# Patient Record
Sex: FEMALE | Race: WHITE | NOT HISPANIC OR LATINO
[De-identification: names, ages, dates, MRNs, and addresses within clinical notes are randomized per-mention and may not be internally consistent; named-entity substitution may affect disease eponyms.]

---

## 2020-04-10 ENCOUNTER — TRANSCRIPTION ENCOUNTER (OUTPATIENT)
Age: 50
End: 2020-04-10

## 2021-08-25 PROBLEM — Z00.00 ENCOUNTER FOR PREVENTIVE HEALTH EXAMINATION: Status: ACTIVE | Noted: 2021-08-25

## 2021-09-07 ENCOUNTER — APPOINTMENT (OUTPATIENT)
Dept: HEPATOLOGY | Facility: CLINIC | Age: 51
End: 2021-09-07
Payer: MEDICAID

## 2021-09-07 VITALS
WEIGHT: 201 LBS | HEART RATE: 112 BPM | SYSTOLIC BLOOD PRESSURE: 157 MMHG | RESPIRATION RATE: 14 BRPM | DIASTOLIC BLOOD PRESSURE: 84 MMHG | TEMPERATURE: 97.9 F

## 2021-09-07 DIAGNOSIS — R73.03 PREDIABETES.: ICD-10-CM

## 2021-09-07 DIAGNOSIS — Z72.89 OTHER PROBLEMS RELATED TO LIFESTYLE: ICD-10-CM

## 2021-09-07 PROCEDURE — 99205 OFFICE O/P NEW HI 60 MIN: CPT

## 2021-09-08 LAB
ALBUMIN SERPL ELPH-MCNC: 4.9 G/DL
ALP BLD-CCNC: 154 U/L
ALT SERPL-CCNC: 57 U/L
ANION GAP SERPL CALC-SCNC: 12 MMOL/L
AST SERPL-CCNC: 38 U/L
BASOPHILS # BLD AUTO: 0.05 K/UL
BASOPHILS NFR BLD AUTO: 0.8 %
BILIRUB SERPL-MCNC: 0.3 MG/DL
BUN SERPL-MCNC: 12 MG/DL
CALCIUM SERPL-MCNC: 10.8 MG/DL
CHLORIDE SERPL-SCNC: 103 MMOL/L
CHOLEST SERPL-MCNC: 278 MG/DL
CO2 SERPL-SCNC: 27 MMOL/L
CREAT SERPL-MCNC: 0.82 MG/DL
DEPRECATED KAPPA LC FREE/LAMBDA SER: 0.98 RATIO
EOSINOPHIL # BLD AUTO: 0.04 K/UL
EOSINOPHIL NFR BLD AUTO: 0.7 %
ESTIMATED AVERAGE GLUCOSE: 134 MG/DL
HAV IGM SER QL: NONREACTIVE
HBA1C MFR BLD HPLC: 6.3 %
HBV CORE IGM SER QL: NONREACTIVE
HBV SURFACE AG SER QL: NONREACTIVE
HCT VFR BLD CALC: 43.1 %
HCV AB SER QL: NONREACTIVE
HCV S/CO RATIO: 0.14 S/CO
HDLC SERPL-MCNC: 59 MG/DL
HEPATITIS A IGG ANTIBODY: REACTIVE
HGB BLD-MCNC: 14 G/DL
IGA SER QL IEP: 359 MG/DL
IGG SER QL IEP: 1472 MG/DL
IGM SER QL IEP: 237 MG/DL
IMM GRANULOCYTES NFR BLD AUTO: 0.2 %
INR PPP: 0.96 RATIO
KAPPA LC CSF-MCNC: 1.69 MG/DL
KAPPA LC SERPL-MCNC: 1.65 MG/DL
LDLC SERPL CALC-MCNC: 177 MG/DL
LYMPHOCYTES # BLD AUTO: 2.37 K/UL
LYMPHOCYTES NFR BLD AUTO: 39.2 %
MAN DIFF?: NORMAL
MCHC RBC-ENTMCNC: 29.9 PG
MCHC RBC-ENTMCNC: 32.5 GM/DL
MCV RBC AUTO: 92.1 FL
MONOCYTES # BLD AUTO: 0.46 K/UL
MONOCYTES NFR BLD AUTO: 7.6 %
NEUTROPHILS # BLD AUTO: 3.12 K/UL
NEUTROPHILS NFR BLD AUTO: 51.5 %
NONHDLC SERPL-MCNC: 219 MG/DL
PLATELET # BLD AUTO: 249 K/UL
POTASSIUM SERPL-SCNC: 4.3 MMOL/L
PROT SERPL-MCNC: 8 G/DL
PT BLD: 11.3 SEC
RBC # BLD: 4.68 M/UL
RBC # FLD: 13.2 %
SODIUM SERPL-SCNC: 142 MMOL/L
TRIGL SERPL-MCNC: 210 MG/DL
WBC # FLD AUTO: 6.05 K/UL

## 2021-09-08 NOTE — REVIEW OF SYSTEMS
[Suicidal] : not suicidal [Sleep Disturbances] : no sleep disturbances [Anxiety] : no anxiety [Depression] : depression [Change In Personality] : no personality change [Emotional Problems] : no emotional problems [Negative] : Heme/Lymph [de-identified] : On Meds

## 2021-09-08 NOTE — HISTORY OF PRESENT ILLNESS
[de-identified] : Ms. FEMI LAM is 51 year old female who presents for the initial evaluation with Liver enzymes elevations. She feels well. Denies c/o abdominal pain, pruritis, melena, No MH/O Liver diseases, clinical hepatitis, Jaundice.\par \par Social H/O denies alcohol use or smoking. Reports no high risk occupation/behavior.\par MH/O- Pre-diabetic and SH/O- Cholecystectomy for Cholelithiasis.\par Denies any FH/O Liver disease or GI cancers. \par \par Allergies - NKDA\par COL/EGD – None \par Immunity – Immune to HAV, pending HBV. She completed the 2 dose COVID vaccine with no adverse effects in April 2021. S/p COVID infection in Feb 2021.\par \par Labs on 09/07/21 shows elevated ALT 57, , WDL- AST 38, Tbil 0.3, CBC, INR, Non-reactive acute Hepatitis A, B and C. A1C 6.3%, Hyperlipidemia with Cho/Tgl 210/278.\par Labs in 08/24/2021 shows AST/ALT 41/53,  with WDL Tbil 0.4. ASMA 1:80. \par US ab on 06/11/2021 shows Hepatic steatosis, Hepatomegaly, Left renal cyst.\par

## 2021-09-08 NOTE — PHYSICAL EXAM
[Scleral Icterus] : No Scleral Icterus [Spider Angioma] : No spider angioma(s) were observed [Abdominal  Ascites] : no ascites [Asterixis] : no asterixis observed [Jaundice] : No jaundice [Palmar Erythema] : no Palmar Erythema [Depression] : depression [FreeTextEntry7] : On meds [General Appearance - Alert] : alert [General Appearance - Well Nourished] : well nourished [General Appearance - Well-Appearing] : healthy appearing [Sclera] : the sclera and conjunctiva were normal [Neck Appearance] : the appearance of the neck was normal [Neck Cervical Mass (___cm)] : no neck mass was observed [Apical Impulse] : the apical impulse was normal [Exaggerated Use Of Accessory Muscles For Inspiration] : no accessory muscle use [Heart Sounds] : normal S1 and S2 [Edema] : there was no peripheral edema [Veins - Varicosity Changes] : there were no varicosital changes [Bowel Sounds] : normal bowel sounds [Abdomen Hernia] : no hernia was discovered [Abdomen Mass (___ Cm)] : no abdominal mass palpated [Cervical Lymph Nodes Enlarged Posterior Bilaterally] : posterior cervical [Supraclavicular Lymph Nodes Enlarged Bilaterally] : supraclavicular [Nail Clubbing] : no clubbing  or cyanosis of the fingernails [Involuntary Movements] : no involuntary movements were seen [] : no rash [Skin Lesions] : no skin lesions [No Focal Deficits] : no focal deficits [Sensation] : the sensory exam was normal to light touch and pinprick [Oriented To Time, Place, And Person] : oriented to person, place, and time

## 2021-09-08 NOTE — ASSESSMENT
[FreeTextEntry1] : Ms. FEMI LAM is 51 year old female with Liver enzymes elevations. \par \par # Elevated Liver enzymes - Labs on 09/07/21 shows elevated ALT 57, , WDL- AST 38, Tbil 0.3, CBC, INR, Non-reactive acute Hepatitis A, B and C. ASMA 1:80. Ruled out Acute Hepatitis. \par \par ? DILI – On psychiatrist meds and over 5 OTC vitamins and  supplements. Advised to stop all the Meds which is avoidable for now and recheck labs in a  month. Pending the immune panel.\par \par # Hepatic steatosis - US ab on 06/11/2021 shows Hepatic steatosis, Hepatomegaly, Left renal cyst. Advised to get the FS done to quantify the fibrosis and steatosis.  \par FEMI was educated about the fatty liver disease. Reviewed the spectrum of disease, the risk of disease progression to developing cirrhosis and the associated complications. Explained the patient may develop liver cancer without cirrhosis and therefore should be under the yearly surveillance with an abdominal ultrasound. Taught back that the best treatment of fatty liver disease is diet and exercise. Discussed the present diet with the patient and recommended the avoidance of fatty foods and to follow a heart healthy diet. Also explained that weight loss may lead to an improvement in the overall underlying liver disease. Taught back the physiology of alcohol abstinence has a important contribution to liver health. \par \par # Risk factors- A1C 6.3%, Hyperlipidemia with Cho/Tgl 210/278. MH/O- Pre-diabetic and SH/O- Cholecystectomy for Cholelithiasis.\par \par # COL/EGD – Denies any FH/O Liver disease or GI cancers. \par # Immunity – Immune to HAV, pending HBV. She completed the 2 dose COVID vaccine with no adverse effects in April 2021. S/p COVID infection in Feb 2021.\par \par PLAN to F/U in a month for RPA with FS to discuss the plan of care.\par Encouraged to call back in the interim with any issues or concerns so that we can address and assist as required.

## 2021-09-09 LAB
ANA PAT FLD IF-IMP: ABNORMAL
ANA SER IF-ACNC: ABNORMAL

## 2021-09-10 LAB
MITOCHONDRIA AB SER IF-ACNC: NORMAL
SMOOTH MUSCLE AB SER QL IF: ABNORMAL

## 2021-09-17 ENCOUNTER — APPOINTMENT (OUTPATIENT)
Dept: UROLOGY | Facility: CLINIC | Age: 51
End: 2021-09-17
Payer: MEDICAID

## 2021-09-17 VITALS
WEIGHT: 200 LBS | RESPIRATION RATE: 14 BRPM | HEART RATE: 73 BPM | HEIGHT: 67 IN | TEMPERATURE: 97.8 F | DIASTOLIC BLOOD PRESSURE: 82 MMHG | OXYGEN SATURATION: 99 % | SYSTOLIC BLOOD PRESSURE: 121 MMHG | BODY MASS INDEX: 31.39 KG/M2

## 2021-09-17 DIAGNOSIS — Z86.59 PERSONAL HISTORY OF OTHER MENTAL AND BEHAVIORAL DISORDERS: ICD-10-CM

## 2021-09-17 DIAGNOSIS — Z63.5 DISRUPTION OF FAMILY BY SEPARATION AND DIVORCE: ICD-10-CM

## 2021-09-17 DIAGNOSIS — Z87.891 PERSONAL HISTORY OF NICOTINE DEPENDENCE: ICD-10-CM

## 2021-09-17 PROCEDURE — 99203 OFFICE O/P NEW LOW 30 MIN: CPT | Mod: 25

## 2021-09-17 PROCEDURE — 51736 URINE FLOW MEASUREMENT: CPT

## 2021-09-17 SDOH — SOCIAL STABILITY - SOCIAL INSECURITY: DISRUPTION OF FAMILY BY SEPARATION AND DIVORCE: Z63.5

## 2021-09-17 NOTE — LETTER BODY
[Dear  ___] : Dear  [unfilled], [Consult Letter:] : I had the pleasure of evaluating your patient, [unfilled]. [Consult Closing:] : Thank you very much for allowing me to participate in the care of this patient.  If you have any questions, please do not hesitate to contact me. [FreeTextEntry1] : Address: [address] [place] [id number]\par Tel: [phone]

## 2021-09-17 NOTE — PHYSICAL EXAM
[General Appearance - Well Developed] : well developed [General Appearance - Well Nourished] : well nourished [Normal Appearance] : normal appearance [Well Groomed] : well groomed [General Appearance - In No Acute Distress] : no acute distress [Edema] : no peripheral edema [Respiration, Rhythm And Depth] : normal respiratory rhythm and effort [Exaggerated Use Of Accessory Muscles For Inspiration] : no accessory muscle use [Abdomen Soft] : soft [Abdomen Tenderness] : non-tender [Costovertebral Angle Tenderness] : no ~M costovertebral angle tenderness [FreeTextEntry1] : Bladder not distended [Normal Station and Gait] : the gait and station were normal for the patient's age [] : no rash [No Focal Deficits] : no focal deficits [Oriented To Time, Place, And Person] : oriented to person, place, and time [Affect] : the affect was normal [Mood] : the mood was normal [Not Anxious] : not anxious

## 2021-09-17 NOTE — REVIEW OF SYSTEMS
[Dry Eyes] : dryness of the eyes [Palpitations] : palpitations [Diarrhea] : diarrhea [Heartburn] : heartburn [Presently in menopause ___] : presently in menopause [unfilled] [Leakage of urine with straining, coughing, laughing] : leakage of urine with straining, coughing, laughing [Joint Pain] : joint pain [Itching] : itching [Dizziness] : dizziness [Anxiety] : anxiety [Depression] : depression [Hot Flashes] : hot flashes [Negative] : Heme/Lymph

## 2021-09-17 NOTE — HISTORY OF PRESENT ILLNESS
[FreeTextEntry1] : She is a 51-year-old woman who is seen today for initial visit.  She is here today to discuss results of ultrasound.  Ultrasound was done to evaluate the liver.  Sometimes she has nocturia 3-4 times and at other times is only once.  Every now and then she has to double void to empty her bladder.  Uroflow today showed normal maximum urine flow of 20 mL/s, average 15 mL/s, she voided 420 cc and the residual urine volume was about 100 cc.  Ultrasound from radiology in June 2021 showed a left 0.23 cm renal cyst.

## 2021-09-17 NOTE — ASSESSMENT
[FreeTextEntry1] : Ultrasound report was discussed with her.  Simple renal cysts are sacs filled with fluid. The exact cause of renal cysts is unknown but cysts are more common with advancing age. Up to one third of patients over age 70 have renal cysts. Having many renal cysts is different than polycystic renal disease. Simple renal cysts are usually found incidentally with imaging studies (US, CT scan or MRI), are usually asymptomatic and do not require any treatment. Very large renal cysts can cause dull pain or discomfort. Cysts can rarely become infected, develop bleeding, rupture or impair renal function. Surgical removal or drainage and sclerotherapy of renal cysts can be performed in specific clinical settings. Bosniak classification of renal cysts into 5 categories was reviewed: simple (category I), minimally complex (category II and IIF), indeterminate (category III) and solid (category IV). Malignancy risk, treatment and follow-up of renal cysts based on category were discussed. Patient's questions were answered.  She appears to have a simple renal cyst which generally does not require any further follow-up.  Uroflow results was discussed.  She does not seem to be very bothered by urination.  She can follow-up in 1 year or as needed.\par \par Seb Hussein MD, FACS\par [facility]\par  of Urology\par \par [address], [address]\[place] [address]\par \par [address], [address]\[place] [address]\par \par Tel: [phone]\par Fax: [phone]

## 2021-10-12 ENCOUNTER — APPOINTMENT (OUTPATIENT)
Dept: HEPATOLOGY | Facility: CLINIC | Age: 51
End: 2021-10-12
Payer: MEDICAID

## 2021-10-12 VITALS
HEART RATE: 73 BPM | WEIGHT: 201 LBS | HEIGHT: 67 IN | TEMPERATURE: 97.9 F | RESPIRATION RATE: 14 BRPM | SYSTOLIC BLOOD PRESSURE: 138 MMHG | DIASTOLIC BLOOD PRESSURE: 87 MMHG | BODY MASS INDEX: 31.55 KG/M2

## 2021-10-12 PROCEDURE — 99215 OFFICE O/P EST HI 40 MIN: CPT

## 2021-10-12 PROCEDURE — 91200 LIVER ELASTOGRAPHY: CPT

## 2021-10-12 RX ORDER — CYCLOBENZAPRINE HYDROCHLORIDE 5 MG/1
5 TABLET, FILM COATED ORAL
Qty: 14 | Refills: 0 | Status: DISCONTINUED | COMMUNITY
Start: 2021-06-04 | End: 2021-10-12

## 2021-10-12 NOTE — REVIEW OF SYSTEMS
[Depression] : depression [Negative] : Heme/Lymph [Suicidal] : not suicidal [Sleep Disturbances] : no sleep disturbances [Anxiety] : no anxiety [Change In Personality] : no personality change [Emotional Problems] : no emotional problems [de-identified] : On Meds

## 2021-10-12 NOTE — PHYSICAL EXAM
[Depression] : depression [General Appearance - Alert] : alert [General Appearance - Well Nourished] : well nourished [General Appearance - Well-Appearing] : healthy appearing [Sclera] : the sclera and conjunctiva were normal [Neck Appearance] : the appearance of the neck was normal [Neck Cervical Mass (___cm)] : no neck mass was observed [Exaggerated Use Of Accessory Muscles For Inspiration] : no accessory muscle use [Apical Impulse] : the apical impulse was normal [Heart Sounds] : normal S1 and S2 [Edema] : there was no peripheral edema [Veins - Varicosity Changes] : there were no varicosital changes [Bowel Sounds] : normal bowel sounds [Abdomen Mass (___ Cm)] : no abdominal mass palpated [Abdomen Hernia] : no hernia was discovered [Cervical Lymph Nodes Enlarged Posterior Bilaterally] : posterior cervical [Supraclavicular Lymph Nodes Enlarged Bilaterally] : supraclavicular [Nail Clubbing] : no clubbing  or cyanosis of the fingernails [Involuntary Movements] : no involuntary movements were seen [] : no rash [Skin Lesions] : no skin lesions [Sensation] : the sensory exam was normal to light touch and pinprick [No Focal Deficits] : no focal deficits [Oriented To Time, Place, And Person] : oriented to person, place, and time [Scleral Icterus] : No Scleral Icterus [Spider Angioma] : No spider angioma(s) were observed [Abdominal  Ascites] : no ascites [Asterixis] : no asterixis observed [Jaundice] : No jaundice [Palmar Erythema] : no Palmar Erythema [FreeTextEntry7] : On meds

## 2021-10-12 NOTE — HISTORY OF PRESENT ILLNESS
[de-identified] : Ms. FEMI LAM is 51 year old female who presents for the initial evaluation with Liver enzymes elevations. She feels well. Denies c/o abdominal pain, pruritis, melena, No MH/O Liver diseases, clinical hepatitis, Jaundice.\par \par Social H/O denies alcohol use or smoking. Reports no high risk occupation/behavior.\par MH/O- Pre-diabetic and SH/O- Cholecystectomy for Cholelithiasis.\par Denies any FH/O Liver disease or GI cancers. \par \par Immunity – Immune to HAV, pending HBV. She completed the 2 dose COVID vaccine with no adverse effects in April 2021. S/p COVID infection in Feb 2021.\par \par Labs on 09/07/21 shows elevated ALT 57, , WDL- AST 38, Tbil 0.3, CBC, INR, Non-reactive acute Hepatitis A, B and C. A1C 6.3%, Hyperlipidemia with Cho/Tgl 210/278.\par Labs in 08/24/2021 shows AST/ALT 41/53,  with WDL Tbil 0.4. ASMA 1:80. \par \par US ab on 06/11/2021 shows Hepatic steatosis, Hepatomegaly, Left renal cyst.\par

## 2021-10-12 NOTE — ASSESSMENT
[FreeTextEntry1] : Ms. FEMI LAM is 51 year old female with Liver enzymes elevations. \par \par # Elevated Liver enzymes - Labs on 09/07/21 shows elevated ALT 57, , WDL- AST 38, Tbil 0.3, CBC, INR, Non-reactive acute Hepatitis A, B and C. ASMA 1:80. Ruled out Acute Hepatitis. Will repeat labs today and in a month, advised to call back to discuss the results. \par \par ? PBC – ALP elevated with normal Tbil and SAMMY, ASMA positive and AMA negative. Will order more autoimmune labs as ordered. Advised to call back to discuss the results. FS shows no fibrosis. Do not want to do any Liver Biopsy yet. Will re-address it with next RPA.\par \par # DILI – On psychiatrist meds and over 5 OTC vitamins and supplements. Advised to stop all the Meds which is avoidable for now and recheck labs in a month. Discussed the LiverTox likelihood scoring with Amitriptyline- B, Metoprolol –D, Cyclobenzaprine and Sumatriptan – E. \par \par # Hepatic steatosis - US ab on 06/11/2021 shows Hepatic steatosis, Hepatomegaly, Left renal cyst. FS done shows S3 steatosis. \par FEMI was educated about the fatty liver disease. Reviewed the spectrum of disease, the risk of disease progression to developing cirrhosis and the associated complications. Explained the patient may develop liver cancer without cirrhosis and therefore should be under the yearly surveillance with an abdominal ultrasound. Taught back that the best treatment of fatty liver disease is diet and exercise. Discussed the present diet with the patient and recommended the avoidance of fatty foods and to follow a heart healthy diet. Also explained that weight loss may lead to an improvement in the overall underlying liver disease. Taught back the physiology of alcohol abstinence has an important contribution to liver health. \par \par # Metabolic syndrome- A1C 6.3%, Hyperlipidemia with Cho/Tgl 210/278, BMI>30, HTN. MH/O- Pre-diabetic and SH/O- Cholecystectomy for Cholelithiasis.\par Reviewed the spectrum of disease, the risk of disease progression with added risk factors commonly seen in patients with diabetes or those who are overweight or vice versa, a precursor to the development of diabetes as it is a component of the metabolic syndrome. Advised to F/U with PCP/Cardiology for treatment regimen. \par \par # COL/EGD – Denies any FH/O Liver disease or GI cancers. Wanted to discuss with later RPA’s.\par # Immunity – Immune to HAV, pending HBV Ab. She completed the 2 dose COVID vaccine with no adverse effects in April 2021. S/p COVID infection in Feb 2021.\par \par # Renal cyst – Simple cyst incidentally found in the US ab, No further intervention required. F/U Orology Dr. Seb Hussein.\par \par PLAN to F/U in 3 month for RPA and US ab.\par Encouraged to call back in the interim with any issues or concerns so that we can address and assist as required. \par

## 2021-10-13 LAB
25(OH)D3 SERPL-MCNC: 29.6 NG/ML
ALBUMIN SERPL ELPH-MCNC: 4.7 G/DL
ALP BLD-CCNC: 136 U/L
ALT SERPL-CCNC: 55 U/L
ANION GAP SERPL CALC-SCNC: 11 MMOL/L
AST SERPL-CCNC: 37 U/L
BILIRUB SERPL-MCNC: 0.5 MG/DL
BUN SERPL-MCNC: 11 MG/DL
CALCIUM SERPL-MCNC: 10.4 MG/DL
CHLORIDE SERPL-SCNC: 104 MMOL/L
CO2 SERPL-SCNC: 27 MMOL/L
CREAT SERPL-MCNC: 0.8 MG/DL
POTASSIUM SERPL-SCNC: 4.9 MMOL/L
PROT SERPL-MCNC: 8 G/DL
SODIUM SERPL-SCNC: 141 MMOL/L

## 2021-10-14 LAB
ANA PAT FLD IF-IMP: ABNORMAL
ANA SER IF-ACNC: ABNORMAL

## 2021-10-15 LAB — SMOOTH MUSCLE AB SER QL IF: ABNORMAL

## 2021-10-18 LAB
ALP BLD-CCNC: 132 IU/L
ALP BONE CFR SERPL: 45 %
ALP INTEST CFR SERPL: 12 %
ALP LIVER CFR SERPL: 43 %
ANCA AB SER-IMP: ABNORMAL
C-ANCA SER-ACNC: NEGATIVE
LKM AB SER QL IF: <20.1 UNITS
P-ANCA TITR SER IF: NEGATIVE
VIT A SERPL-MCNC: 70 UG/DL

## 2021-10-20 LAB — SOLUBLE LIVER IGG SER IA-ACNC: 1.3

## 2021-11-02 ENCOUNTER — APPOINTMENT (OUTPATIENT)
Dept: RADIOLOGY | Facility: CLINIC | Age: 51
End: 2021-11-02
Payer: MEDICAID

## 2021-11-02 ENCOUNTER — OUTPATIENT (OUTPATIENT)
Dept: OUTPATIENT SERVICES | Facility: HOSPITAL | Age: 51
LOS: 1 days | End: 2021-11-02
Payer: MEDICAID

## 2021-11-02 DIAGNOSIS — R74.8 ABNORMAL LEVELS OF OTHER SERUM ENZYMES: ICD-10-CM

## 2021-11-02 PROCEDURE — 77080 DXA BONE DENSITY AXIAL: CPT | Mod: 26

## 2021-11-02 PROCEDURE — 77080 DXA BONE DENSITY AXIAL: CPT

## 2021-11-19 LAB
ALBUMIN SERPL ELPH-MCNC: 4.8 G/DL
ALP BLD-CCNC: 152 U/L
ALT SERPL-CCNC: 43 U/L
ANION GAP SERPL CALC-SCNC: 12 MMOL/L
AST SERPL-CCNC: 33 U/L
BASOPHILS # BLD AUTO: 0.03 K/UL
BASOPHILS NFR BLD AUTO: 0.6 %
BILIRUB SERPL-MCNC: 0.3 MG/DL
BUN SERPL-MCNC: 10 MG/DL
CALCIUM SERPL-MCNC: 10.3 MG/DL
CHLORIDE SERPL-SCNC: 104 MMOL/L
CO2 SERPL-SCNC: 25 MMOL/L
CREAT SERPL-MCNC: 0.77 MG/DL
EOSINOPHIL # BLD AUTO: 0.05 K/UL
EOSINOPHIL NFR BLD AUTO: 1 %
GGT SERPL-CCNC: 33 U/L
HCT VFR BLD CALC: 42.8 %
HGB BLD-MCNC: 13.7 G/DL
IMM GRANULOCYTES NFR BLD AUTO: 0.2 %
LYMPHOCYTES # BLD AUTO: 2.52 K/UL
LYMPHOCYTES NFR BLD AUTO: 52.1 %
MAN DIFF?: NORMAL
MCHC RBC-ENTMCNC: 29.8 PG
MCHC RBC-ENTMCNC: 32 GM/DL
MCV RBC AUTO: 93 FL
MONOCYTES # BLD AUTO: 0.44 K/UL
MONOCYTES NFR BLD AUTO: 9.1 %
NEUTROPHILS # BLD AUTO: 1.79 K/UL
NEUTROPHILS NFR BLD AUTO: 37 %
PLATELET # BLD AUTO: 244 K/UL
POTASSIUM SERPL-SCNC: 4.4 MMOL/L
PROT SERPL-MCNC: 7.7 G/DL
RBC # BLD: 4.6 M/UL
RBC # FLD: 13 %
SODIUM SERPL-SCNC: 141 MMOL/L
WBC # FLD AUTO: 4.84 K/UL

## 2021-11-22 LAB — HBV SURFACE AB SER QL: NONREACTIVE

## 2021-11-24 LAB — SMOOTH MUSCLE AB SER QL IF: ABNORMAL

## 2022-03-15 ENCOUNTER — APPOINTMENT (OUTPATIENT)
Dept: HEPATOLOGY | Facility: CLINIC | Age: 52
End: 2022-03-15
Payer: MEDICAID

## 2022-03-15 VITALS
HEART RATE: 96 BPM | WEIGHT: 202 LBS | DIASTOLIC BLOOD PRESSURE: 85 MMHG | TEMPERATURE: 97.7 F | OXYGEN SATURATION: 99 % | RESPIRATION RATE: 15 BRPM | BODY MASS INDEX: 31.71 KG/M2 | SYSTOLIC BLOOD PRESSURE: 142 MMHG | HEIGHT: 67 IN

## 2022-03-15 PROCEDURE — 99215 OFFICE O/P EST HI 40 MIN: CPT

## 2022-03-15 RX ORDER — METOPROLOL SUCCINATE 50 MG/1
50 TABLET, EXTENDED RELEASE ORAL
Qty: 30 | Refills: 0 | Status: ACTIVE | COMMUNITY
Start: 2021-08-16

## 2022-03-15 RX ORDER — AMITRIPTYLINE HYDROCHLORIDE 50 MG/1
50 TABLET, FILM COATED ORAL
Qty: 30 | Refills: 0 | Status: ACTIVE | COMMUNITY
Start: 2021-11-30

## 2022-03-15 RX ORDER — SUMATRIPTAN 100 MG/1
100 TABLET, FILM COATED ORAL
Qty: 9 | Refills: 0 | Status: ACTIVE | COMMUNITY
Start: 2021-07-05

## 2022-03-16 LAB
ALBUMIN SERPL ELPH-MCNC: 4.8 G/DL
ALP BLD-CCNC: 122 U/L
ALT SERPL-CCNC: 53 U/L
ANION GAP SERPL CALC-SCNC: 14 MMOL/L
AST SERPL-CCNC: 40 U/L
BASOPHILS # BLD AUTO: 0.04 K/UL
BASOPHILS NFR BLD AUTO: 0.7 %
BILIRUB SERPL-MCNC: 0.6 MG/DL
BUN SERPL-MCNC: 15 MG/DL
CALCIUM SERPL-MCNC: 10.3 MG/DL
CHLORIDE SERPL-SCNC: 102 MMOL/L
CO2 SERPL-SCNC: 26 MMOL/L
CREAT SERPL-MCNC: 0.8 MG/DL
EGFR: 89 ML/MIN/1.73M2
EOSINOPHIL # BLD AUTO: 0.03 K/UL
EOSINOPHIL NFR BLD AUTO: 0.5 %
HCT VFR BLD CALC: 45.3 %
HGB BLD-MCNC: 14.3 G/DL
IMM GRANULOCYTES NFR BLD AUTO: 0.2 %
INR PPP: 0.92 RATIO
LYMPHOCYTES # BLD AUTO: 2.39 K/UL
LYMPHOCYTES NFR BLD AUTO: 42.3 %
MAN DIFF?: NORMAL
MCHC RBC-ENTMCNC: 29.3 PG
MCHC RBC-ENTMCNC: 31.6 GM/DL
MCV RBC AUTO: 92.8 FL
MONOCYTES # BLD AUTO: 0.44 K/UL
MONOCYTES NFR BLD AUTO: 7.8 %
NEUTROPHILS # BLD AUTO: 2.74 K/UL
NEUTROPHILS NFR BLD AUTO: 48.5 %
PLATELET # BLD AUTO: 253 K/UL
POTASSIUM SERPL-SCNC: 4.2 MMOL/L
PROT SERPL-MCNC: 7.8 G/DL
PT BLD: 10.7 SEC
RBC # BLD: 4.88 M/UL
RBC # FLD: 13.3 %
SODIUM SERPL-SCNC: 141 MMOL/L
WBC # FLD AUTO: 5.65 K/UL

## 2022-03-16 NOTE — PHYSICAL EXAM
[General Appearance - Alert] : alert [General Appearance - Well Nourished] : well nourished [General Appearance - Well-Appearing] : healthy appearing [Sclera] : the sclera and conjunctiva were normal [Neck Appearance] : the appearance of the neck was normal [Neck Cervical Mass (___cm)] : no neck mass was observed [Exaggerated Use Of Accessory Muscles For Inspiration] : no accessory muscle use [Apical Impulse] : the apical impulse was normal [Heart Sounds] : normal S1 and S2 [Edema] : there was no peripheral edema [Veins - Varicosity Changes] : there were no varicosital changes [Bowel Sounds] : normal bowel sounds [Abdomen Mass (___ Cm)] : no abdominal mass palpated [Abdomen Hernia] : no hernia was discovered [Cervical Lymph Nodes Enlarged Posterior Bilaterally] : posterior cervical [Supraclavicular Lymph Nodes Enlarged Bilaterally] : supraclavicular [Nail Clubbing] : no clubbing  or cyanosis of the fingernails [Involuntary Movements] : no involuntary movements were seen [] : no rash [Skin Lesions] : no skin lesions [Sensation] : the sensory exam was normal to light touch and pinprick [No Focal Deficits] : no focal deficits [Oriented To Time, Place, And Person] : oriented to person, place, and time [Scleral Icterus] : No Scleral Icterus [Spider Angioma] : No spider angioma(s) were observed [Abdominal  Ascites] : no ascites [Asterixis] : no asterixis observed [Jaundice] : No jaundice [Palmar Erythema] : no Palmar Erythema

## 2022-03-16 NOTE — ASSESSMENT
[FreeTextEntry1] : Ms. FEMI LAM is 51 year old female with Liver enzymes elevations. \par \par # Elevated Liver enzymes - Labs on 03/15/2022 shows elevated ALT 53,  with WDL- Tbil 0.6, AST 40, \par R/o Acute LD - Non-reactive acute Hepatitis A, B and C. Will repeat labs today, advised to call back to discuss the results. \par \par ? PBC – ALP elevated with normal Tbil and SAMMY, ASMA positive and AMA negative. 10/12/2021 labs shows Indeterminate Atypical ANCA, 1:80 ASMA, 1:320 Homogenous SAMMY, Negative anti-Soluble Liver Ag 1.3 and anti-LKM, FS shows no fibrosis. Do not want to do any Liver Biopsy yet. Advised to start Ursodiol 500 mg BID as a trial for 3 months and will repeat the AI panel to decide if she needs to c/w the dose.\par \par # DILI – On psychiatrist meds and over 5 OTC vitamins and supplements. Advised to stop all the Meds which is avoidable for now and recheck labs in a month. Discussed the LiverTox likelihood scoring with Amitriptyline- B, Metoprolol –D, Cyclobenzaprine and Sumatriptan – E. \par C/o Migraines- On _triptans for breakthrough episodes, advised to F/U with her neurology MD.\par \par # Hepatic steatosis - US ab on 06/11/2021 shows Hepatic steatosis, Hepatomegaly, Left renal cyst. FS done shows S3 steatosis. \par FEMI was educated about the fatty liver disease. Reviewed the spectrum of disease, the risk of disease progression to developing cirrhosis and the associated complications. Explained the patient may develop liver cancer without cirrhosis and therefore should be under the yearly surveillance with an abdominal ultrasound. Taught back that the best treatment of fatty liver disease is diet and exercise. Discussed the present diet with the patient and recommended the avoidance of fatty foods and to follow a heart healthy diet. Also explained that weight loss may lead to an improvement in the overall underlying liver disease. Taught back the physiology of alcohol abstinence has an important contribution to liver health. \par \par # Metabolic syndrome- A1C 6.3%, Hyperlipidemia with Cho/Tgl 210/278, BMI>30, HTN. \par Reviewed the spectrum of disease, the risk of disease progression with added risk factors commonly seen in patients with diabetes or those who are overweight or vice versa, a precursor to the development of diabetes as it is a component of the metabolic syndrome. Advised to F/U with PCP/Cardiology for treatment regimen. \par \par # COL/EGD – Denies any FH/O Liver disease or GI cancers. Wanted to discuss with later RPA’s.\par # Immunity – Immune to HAV (09/07/2021), not to HBV (11/18/2021). She completed the 2 dose COVID vaccine with no adverse effects in April 2021. S/p COVID infection in Feb 2021. She would like to get the INJ with the PCP.\par \par # Renal cyst – Simple cyst incidentally found in the US ab, No further intervention required. Fs/U Urology Dr. Seb Hussein.\par \par PLAN to F/U in 3 month for RPA and US ab.\par Encouraged to call back in the interim with any issues or concerns so that we can address and assist as required.\par \par

## 2022-03-16 NOTE — REVIEW OF SYSTEMS
[Negative] : Heme/Lymph [Suicidal] : not suicidal [Sleep Disturbances] : no sleep disturbances [Anxiety] : no anxiety [Depression] : no depression [Change In Personality] : no personality change [Emotional Problems] : no emotional problems [de-identified] : On Meds for depression

## 2022-03-16 NOTE — HISTORY OF PRESENT ILLNESS
[de-identified] : Ms. FEMI LAM is 51 year old female who presents with Liver enzymes elevations. She feels well. Denies c/o abdominal pain, pruritis, melena, No MH/O Liver diseases, clinical hepatitis, Jaundice.\par \par Social H/O denies alcohol use or smoking. Reports no high risk occupation/behavior.\par MH/O- Pre-diabetic and SH/O- Cholecystectomy for Cholelithiasis.\par Denies any FH/O Liver disease or GI cancers. \par \par Normal Bone density done on 11/02/2021 repeat recommended in 5 years-2026.\par FS on 10/12/2021 shows F0-F1 – 4.5 kPa, S1 - Stage 1 - 11% to 33%\par Immunity – Immune to HAV (09/07/2021), not to HBV (11/18/2021). She completed the 2 dose COVID vaccine with no adverse effects in April 2021. S/p COVID infection in Feb 2021.\par \par Labs on 03/15/2022 shows elevated ALT 53,  with WDL-Tbil 0.6, AST 40, BMP, INR, CBC.\par \par Labs on 11/18/2021 shows elevated  with AST/ALT 33/43/0.3 Tbil, WDL- GGT, 1:80 ASMA, , Hb/Hct 13.7/42.8.\par \par 10/12/2021 labs shows Indeterminate Atypical ANCA, 1:80 ASMA, 1:320 Homogenous SAMMY, Negative anti-Soluble Liver Ag 1.3 and anti-LKM, High Vit A 70 and Insufficient Vit D 29.6. ALP Isoenzymes shows LBI-43/45/12%\par \par Labs on 09/07/21 shows elevated ALT 57, , WDL- AST 38, Tbil 0.3, CBC, INR, Non-reactive acute Hepatitis A, B and C. A1C 6.3%, Hyperlipidemia with Cho/Tgl 210/278.\par Labs in 08/24/2021 shows AST/ALT 41/53,  with WDL Tbil 0.4. ASMA 1:80. \par \par US ab on 06/11/2021 shows Hepatic steatosis, Hepatomegaly, Left renal cyst.\par

## 2022-03-17 RX ORDER — CYANOCOBALAMIN (VITAMIN B-12) 500 MCG
400 LOZENGE ORAL DAILY
Refills: 0 | Status: DISCONTINUED | COMMUNITY
Start: 2021-10-12 | End: 2022-03-17

## 2022-03-18 LAB
ANA PAT FLD IF-IMP: ABNORMAL
ANA SER IF-ACNC: ABNORMAL
ANCA AB SER-IMP: ABNORMAL
C-ANCA SER-ACNC: NEGATIVE
P-ANCA TITR SER IF: NEGATIVE
SMOOTH MUSCLE AB SER QL IF: ABNORMAL

## 2022-09-07 ENCOUNTER — RESULT REVIEW (OUTPATIENT)
Age: 52
End: 2022-09-07

## 2022-09-07 ENCOUNTER — OUTPATIENT (OUTPATIENT)
Dept: OUTPATIENT SERVICES | Facility: HOSPITAL | Age: 52
LOS: 1 days | End: 2022-09-07
Payer: MEDICAID

## 2022-09-07 ENCOUNTER — APPOINTMENT (OUTPATIENT)
Dept: ULTRASOUND IMAGING | Facility: CLINIC | Age: 52
End: 2022-09-07

## 2022-09-07 DIAGNOSIS — Z00.00 ENCOUNTER FOR GENERAL ADULT MEDICAL EXAMINATION WITHOUT ABNORMAL FINDINGS: ICD-10-CM

## 2022-09-07 LAB
ALBUMIN SERPL ELPH-MCNC: 4.6 G/DL
ALP BLD-CCNC: 175 U/L
ALT SERPL-CCNC: 50 U/L
ANION GAP SERPL CALC-SCNC: 11 MMOL/L
AST SERPL-CCNC: 34 U/L
BASOPHILS # BLD AUTO: 0.05 K/UL
BASOPHILS NFR BLD AUTO: 0.9 %
BILIRUB SERPL-MCNC: 0.3 MG/DL
BUN SERPL-MCNC: 12 MG/DL
CALCIUM SERPL-MCNC: 10.4 MG/DL
CHLORIDE SERPL-SCNC: 103 MMOL/L
CO2 SERPL-SCNC: 26 MMOL/L
CREAT SERPL-MCNC: 0.8 MG/DL
EGFR: 88 ML/MIN/1.73M2
EOSINOPHIL # BLD AUTO: 0.08 K/UL
EOSINOPHIL NFR BLD AUTO: 1.4 %
GGT SERPL-CCNC: 31 U/L
HCT VFR BLD CALC: 41.1 %
HGB BLD-MCNC: 13.4 G/DL
IMM GRANULOCYTES NFR BLD AUTO: 0 %
INR PPP: 0.87 RATIO
LYMPHOCYTES # BLD AUTO: 3.1 K/UL
LYMPHOCYTES NFR BLD AUTO: 55.4 %
MAN DIFF?: NORMAL
MCHC RBC-ENTMCNC: 30.1 PG
MCHC RBC-ENTMCNC: 32.6 GM/DL
MCV RBC AUTO: 92.4 FL
MONOCYTES # BLD AUTO: 0.5 K/UL
MONOCYTES NFR BLD AUTO: 8.9 %
NEUTROPHILS # BLD AUTO: 1.87 K/UL
NEUTROPHILS NFR BLD AUTO: 33.4 %
PLATELET # BLD AUTO: 255 K/UL
POTASSIUM SERPL-SCNC: 4.7 MMOL/L
PROT SERPL-MCNC: 7.9 G/DL
PT BLD: 10.2 SEC
RBC # BLD: 4.45 M/UL
RBC # FLD: 13.2 %
SODIUM SERPL-SCNC: 140 MMOL/L
WBC # FLD AUTO: 5.6 K/UL

## 2022-09-07 PROCEDURE — 76700 US EXAM ABDOM COMPLETE: CPT | Mod: 26

## 2022-09-07 PROCEDURE — 76700 US EXAM ABDOM COMPLETE: CPT

## 2022-09-09 LAB
MITOCHONDRIA AB SER IF-ACNC: NORMAL
SMOOTH MUSCLE AB SER QL IF: ABNORMAL

## 2022-09-12 ENCOUNTER — APPOINTMENT (OUTPATIENT)
Dept: HEPATOLOGY | Facility: CLINIC | Age: 52
End: 2022-09-12

## 2022-09-12 VITALS
OXYGEN SATURATION: 100 % | TEMPERATURE: 97.8 F | HEIGHT: 67 IN | HEART RATE: 92 BPM | BODY MASS INDEX: 31.39 KG/M2 | WEIGHT: 200 LBS | RESPIRATION RATE: 16 BRPM | DIASTOLIC BLOOD PRESSURE: 87 MMHG | SYSTOLIC BLOOD PRESSURE: 157 MMHG

## 2022-09-12 PROCEDURE — 99215 OFFICE O/P EST HI 40 MIN: CPT

## 2022-09-12 RX ORDER — NAPROXEN 500 MG/1
500 TABLET ORAL
Qty: 14 | Refills: 0 | Status: COMPLETED | COMMUNITY
Start: 2022-06-29

## 2022-09-12 RX ORDER — CEFDINIR 300 MG/1
300 CAPSULE ORAL
Qty: 14 | Refills: 0 | Status: COMPLETED | COMMUNITY
Start: 2022-07-03

## 2022-09-12 RX ORDER — AMITRIPTYLINE HYDROCHLORIDE 25 MG/1
25 TABLET, FILM COATED ORAL
Qty: 30 | Refills: 1 | Status: DISCONTINUED | COMMUNITY
Start: 2021-08-29 | End: 2022-09-12

## 2022-09-12 RX ORDER — SULFAMETHOXAZOLE AND TRIMETHOPRIM 800; 160 MG/1; MG/1
800-160 TABLET ORAL
Qty: 14 | Refills: 0 | Status: COMPLETED | COMMUNITY
Start: 2022-08-05

## 2022-09-12 NOTE — PHYSICAL EXAM
[General Appearance - Alert] : alert [General Appearance - Well Nourished] : well nourished [General Appearance - Well-Appearing] : healthy appearing [Sclera] : the sclera and conjunctiva were normal [Neck Appearance] : the appearance of the neck was normal [Neck Cervical Mass (___cm)] : no neck mass was observed [Exaggerated Use Of Accessory Muscles For Inspiration] : no accessory muscle use [Apical Impulse] : the apical impulse was normal [Heart Sounds] : normal S1 and S2 [Edema] : there was no peripheral edema [Veins - Varicosity Changes] : there were no varicosital changes [Bowel Sounds] : normal bowel sounds [Abdomen Mass (___ Cm)] : no abdominal mass palpated [Abdomen Hernia] : no hernia was discovered [Cervical Lymph Nodes Enlarged Posterior Bilaterally] : posterior cervical [Supraclavicular Lymph Nodes Enlarged Bilaterally] : supraclavicular [Nail Clubbing] : no clubbing  or cyanosis of the fingernails [Involuntary Movements] : no involuntary movements were seen [] : no rash [Skin Lesions] : no skin lesions [Sensation] : the sensory exam was normal to light touch and pinprick [No Focal Deficits] : no focal deficits [Oriented To Time, Place, And Person] : oriented to person, place, and time [Scleral Icterus] : No Scleral Icterus [Spider Angioma] : No spider angioma(s) were observed [Abdominal  Ascites] : no ascites [Asterixis] : no asterixis observed [Jaundice] : No jaundice [Palmar Erythema] : no Palmar Erythema [Depression] : no depression

## 2022-09-12 NOTE — HISTORY OF PRESENT ILLNESS
[de-identified] : Ms. FEMI LAM is 52 year old female who presents with Liver enzymes elevations. She feels well. Denies c/o abdominal pain, pruritis, melena, No MH/O Liver diseases, clinical hepatitis, Jaundice.\par \par Social H/O denies alcohol use or smoking. Reports no high risk occupation/behavior.\par MH/O- Pre-diabetic and SH/O- Cholecystectomy for Cholelithiasis.\par Denies any FH/O Liver disease or GI cancers. \par \par Normal Bone density done on 11/02/2021 repeat recommended in 5 years-2026.\par FS on 10/12/2021 shows F0-F1 – 4.5 kPa, S1 - Stage 1 - 11% to 33%\par Immunity – Immune to HAV (09/07/2021), not to HBV (11/18/2021), reports to have completed the 3 dose series with her PCP. She completed the 2 dose COVID vaccine with no adverse effects in April 2021. S/p COVID infection in Feb 2021.\par \par US abs on 09/07/22 shows Enlarged, fatty liver. Cholecystectomy. 2.5 cm left renal cyst. \par Labs on 09/06/22 shows elevated ALT 50,  with WDL- AST, Tbil, BMP, ASMA 1:80, AMA < 1:20, CBC, GGT, Low INR 0.87.\par \par Labs on 03/15/2022 shows elevated ALT 53,  with WDL-Tbil 0.6, AST 40, BMP, INR, and CBC.\par \par Labs on 11/18/2021 shows elevated  with AST/ALT 33/43/0.3 Tbil, WDL- GGT, 1:80 ASMA, , Hb/Hct 13.7/42.8.\par Normal DEXA scan on 11/02/2021.\par \par 10/12/2021 labs shows Indeterminate Atypical ANCA, 1:80 ASMA, 1:320 Homogenous SAMMY, Negative anti-Soluble Liver Ag 1.3 and anti-LKM, High Vit A 70 and Insufficient Vit D 29.6. ALP Isoenzymes shows LBI-43/45/12%\par \par Labs on 09/07/21 shows elevated ALT 57, , WDL- AST 38, Tbil 0.3, CBC, INR, Non-reactive acute Hepatitis A, B and C. A1C 6.3%, Hyperlipidemia with Cho/Tgl 210/278.\par Labs in 08/24/2021 shows AST/ALT 41/53,  with WDL Tbil 0.4. ASMA 1:80. \par \par US ab on 06/11/2021 shows Hepatic steatosis, Hepatomegaly, Left renal cyst.\par

## 2022-09-12 NOTE — ASSESSMENT
[FreeTextEntry1] : Ms. FEMI LAM is 52 year old female with Liver enzymes elevations. \par \par # Elevated Liver enzymes - US abs on 09/07/22 shows Enlarged, fatty liver. Cholecystectomy. 2.5 cm left renal cyst. Most probably r/t fatty liver, will ask the pharmacy to help with PA for Ozempic SQ weekly dose. \par Labs on 09/06/22 shows elevated ALT 50,  with WDL- AST, Tbil, BMP, ASMA 1:80, AMA < 1:20, CBC, GGT, Low INR 0.87.\par \par # Hepatic steatosis - US ab on 09/06/2022 shows Hepatic steatosis, Hepatomegaly, Left renal cyst. FS done shows S3 steatosis. \par \par I discussed that semaglutide helps to improve diabetes type 2, obesity, and TAPIA. I discussed that, when used in addition to exercise and reduced caloric diet, weight loss in patients with obesity was 15% without diabetes and 10% with diabetes after 68 weeks. I discussed that a rare type of thyroid cancer, medullary/C-cell tyroid cancer, have been found in animals treated with this class of medications. The patient does not have a family history of this cancer.\par  \par FEMI was educated about the fatty liver disease. Reviewed the spectrum of disease, the risk of disease progression to developing cirrhosis and the associated complications. Explained the patient may develop liver cancer without cirrhosis and therefore should be under the yearly surveillance with an abdominal ultrasound. Taught back that the best treatment of fatty liver disease is diet and exercise. Discussed the present diet with the patient and recommended the avoidance of fatty foods and to follow a heart healthy diet. Also explained that weight loss may lead to an improvement in the overall underlying liver disease. Taught back the physiology of alcohol abstinence has an important contribution to liver health. \par \par # Metabolic syndrome- A1C 6.3%, Hyperlipidemia with Cho/Tgl 210/278, BMI>30, HTN. \par Reviewed the spectrum of disease, the risk of disease progression with added risk factors commonly seen in patients with diabetes or those who are overweight or vice versa, a precursor to the development of diabetes as it is a component of the metabolic syndrome. Advised to F/U with PCP/Cardiology for treatment regimen. \par ? PBC – ALP elevated with normal Tbil and SAMMY, ASMA positive and AMA negative. 10/12/2021 labs shows Indeterminate Atypical ANCA, 1:80 ASMA, 1:320 Homogenous SAMMY, Negative anti-Soluble Liver Ag 1.3 and anti-LKM, Advised to c/w Ursodiol 500 mg BID for now.\par >> FS shows no fibrosis. Do not want to do any Liver Biopsy yet. \par # DILI – On psychiatrist meds and over 5 OTC vitamins and supplements. Advised to stop all the Meds which is avoidable for now and recheck labs in a month. Discussed the LiverTox likelihood scoring with Amitriptyline- B, Metoprolol –D, Cyclobenzaprine and Sumatriptan – E. \par C/o Migraines- on Sumatriptan for breakthrough episodes, advised to F/U with her neurology MD.\par \par # COL/EGD – Denies any FH/O Liver disease or GI cancers. Wanted to discuss with later RPA’s.\par # Immunity – Immune to HAV (09/07/2021), not to HBV (11/18/2021). She completed the 2 dose COVID vaccine with no adverse effects in April 2021. S/p COVID infection in Feb 2021. She would like to get the INJ with the PCP. Will check for HAV antibodies with the next set of labs.\par \par # Renal cyst – Simple cyst incidentally found in the [place] ab, No further intervention required. Fs/U Urology Dr. Seb Hussein.\par \par PLAN to F/U in 3 month for RPA and US ab.\par Encouraged to call back in the interim with any issues or concerns so that we can address and assist as required.\par \par

## 2022-09-22 ENCOUNTER — NON-APPOINTMENT (OUTPATIENT)
Age: 52
End: 2022-09-22

## 2022-11-01 ENCOUNTER — NON-APPOINTMENT (OUTPATIENT)
Age: 52
End: 2022-11-01

## 2023-03-09 LAB
ALBUMIN SERPL ELPH-MCNC: 4.7 G/DL
ALP BLD-CCNC: 172 U/L
ALT SERPL-CCNC: 61 U/L
ANION GAP SERPL CALC-SCNC: 14 MMOL/L
AST SERPL-CCNC: 42 U/L
BASOPHILS # BLD AUTO: 0.04 K/UL
BASOPHILS NFR BLD AUTO: 0.7 %
BILIRUB SERPL-MCNC: 0.3 MG/DL
BUN SERPL-MCNC: 13 MG/DL
CALCIUM SERPL-MCNC: 10.4 MG/DL
CHLORIDE SERPL-SCNC: 101 MMOL/L
CHOLEST SERPL-MCNC: 256 MG/DL
CO2 SERPL-SCNC: 25 MMOL/L
CREAT SERPL-MCNC: 0.75 MG/DL
EGFR: 95 ML/MIN/1.73M2
EOSINOPHIL # BLD AUTO: 0.08 K/UL
EOSINOPHIL NFR BLD AUTO: 1.4 %
ESTIMATED AVERAGE GLUCOSE: 128 MG/DL
HBA1C MFR BLD HPLC: 6.1 %
HBV SURFACE AB SER QL: REACTIVE
HCT VFR BLD CALC: 41.1 %
HDLC SERPL-MCNC: 57 MG/DL
HGB BLD-MCNC: 13.8 G/DL
IMM GRANULOCYTES NFR BLD AUTO: 0.2 %
INR PPP: 0.95 RATIO
LDLC SERPL CALC-MCNC: 166 MG/DL
LYMPHOCYTES # BLD AUTO: 3 K/UL
LYMPHOCYTES NFR BLD AUTO: 51 %
MAN DIFF?: NORMAL
MCHC RBC-ENTMCNC: 30.3 PG
MCHC RBC-ENTMCNC: 33.6 GM/DL
MCV RBC AUTO: 90.3 FL
MONOCYTES # BLD AUTO: 0.47 K/UL
MONOCYTES NFR BLD AUTO: 8 %
NEUTROPHILS # BLD AUTO: 2.28 K/UL
NEUTROPHILS NFR BLD AUTO: 38.7 %
NONHDLC SERPL-MCNC: 199 MG/DL
PLATELET # BLD AUTO: 259 K/UL
POTASSIUM SERPL-SCNC: 4.8 MMOL/L
PROT SERPL-MCNC: 7.8 G/DL
PT BLD: 11.1 SEC
RBC # BLD: 4.55 M/UL
RBC # FLD: 13.2 %
SODIUM SERPL-SCNC: 140 MMOL/L
TRIGL SERPL-MCNC: 164 MG/DL
WBC # FLD AUTO: 5.88 K/UL

## 2023-03-14 ENCOUNTER — APPOINTMENT (OUTPATIENT)
Dept: HEPATOLOGY | Facility: CLINIC | Age: 53
End: 2023-03-14
Payer: MEDICAID

## 2023-03-14 VITALS
BODY MASS INDEX: 32.18 KG/M2 | WEIGHT: 205 LBS | OXYGEN SATURATION: 100 % | DIASTOLIC BLOOD PRESSURE: 89 MMHG | HEART RATE: 78 BPM | SYSTOLIC BLOOD PRESSURE: 141 MMHG | TEMPERATURE: 97.8 F | RESPIRATION RATE: 16 BRPM | HEIGHT: 67 IN

## 2023-03-14 PROCEDURE — 76981 USE PARENCHYMA: CPT

## 2023-03-14 PROCEDURE — 99215 OFFICE O/P EST HI 40 MIN: CPT

## 2023-03-15 NOTE — HISTORY OF PRESENT ILLNESS
[FreeTextEntry1] : Ms. FEMI LAM is 52-year-old female who presents with Liver enzymes elevations. She feels well. Denies c/o abdominal pain, pruritis, melena, No MH/O Liver diseases, clinical hepatitis, Jaundice. \par \par Social H/O denies alcohol use or smoking. Reports no high-risk occupation/behavior. \par MH/O- Pre-diabetic and SH/O- Cholecystectomy for Cholelithiasis. \par Denies any FH/O Liver disease or GI cancers.  \par \par Normal Bone density done on 11/02/2021 repeat recommended in 5 years- 2026. \par FS on 10/12/2021 shows F0-F1 – 4.5 kPa, S1 - Stage 1 - 11% to 33% \par \par Immunity – Immune to HAV (09/07/2021), not to HBV (11/18/2021), reports to have completed the 3-dose series with her PCP. She completed the 2 dose COVID vaccine with no adverse effects in April 2021. S/p COVID infection in Feb 2021. \par \par Labs on 03/08/2023: Elevated AST/ALT 42/61, , A1C 6.1%, Tgl 164, Cho 256, , Non-, WDL- INR, CBC with noted Neut% Lymp%. \par \par US abs on 09/07/22 shows Enlarged, fatty liver. Cholecystectomy. 2.5 cm left renal cyst.  \par Labs on 09/06/22 shows elevated ALT 50,  with WDL- AST, Tbil, BMP, ASMA 1:80, AMA < 1:20, CBC, GGT, Low INR 0.87. \par Labs on 03/15/2022 shows elevated ALT 53,  with WDL-Tbil 0.6, AST 40, BMP, INR, and CBC. \par \par Labs on 11/18/2021 shows elevated  with AST/ALT 33/43/0.3 Tbil, WDL- GGT, 1:80 ASMA, , Hb/Hct 13.7/42.8. \par \par 10/12/2021 labs show Indeterminate Atypical ANCA, 1:80 ASMA, 1:320 Homogenous SAMMY, Negative anti-Soluble Liver Ag 1.3 and anti-LKM, High Vit A 70 and Insufficient Vit D 29.6. ALP Isoenzymes shows LBI-43/45/12% \par Labs on 09/07/21 shows elevated ALT 57, , WDL- AST 38, Tbil 0.3, CBC, INR, Non-reactive acute Hepatitis A, B and C. A1C 6.3%, Hyperlipidemia with Cho/Tgl 210/278. \par \par Labs in 08/24/2021 shows AST/ALT 41/53,  with WDL Tbil 0.4. ASMA 1:80.  \par US ab on 06/11/2021 shows Hepatic steatosis, Hepatomegaly, Left renal cyst. \par \par

## 2023-03-15 NOTE — ASSESSMENT
[FreeTextEntry1] : Ms. FEMI LAM is 52-year-old female with Liver enzymes elevations.  \par \par Labs on 03/08/2023: Elevated AST/ALT 42/61, , A1C 6.1%, Tgl 164, Cho 256, , Non-, WDL- INR, CBC with noted Neut% Lymp%. US abs on 09/07/22 shows Enlarged, fatty liver. Cholecystectomy. 2.5 cm left renal cyst. \par \par # Elevated Liver enzymes – Labs reviewed and with no changes and no etiology for the elevation from the labs and imaging, advised to get the Liver biopsy done as ordered to r/o any AILD. \par \par # Hepatic steatosis - US +Hepatic steatosis, and FS +S3 steatosis.  Elevated liver enzymes Most probably r/t fatty liver, Advised to F/U with PCP for Ozempic SQ weekly dose. \par I discussed that semaglutide helps to improve diabetes type 2, obesity, and TAPIA. I discussed that, when used in addition to exercise and reduced caloric diet, weight loss in patients with obesity was 15% without diabetes and 10% with diabetes after 68 weeks. I discussed that a rare type of thyroid cancer, medullary/C-cell thyroid cancer, have been found in animals treated with this class of medications. The patient does not have a family history of this cancer. \par \par # Metabolic syndrome- A1C 6.3%, Hyperlipidemia with Cho/Tgl 210/278, BMI > 30, HTN.  \par Reviewed the spectrum of disease, the risk of disease progression with added risk factors commonly seen in patients with diabetes or those who are overweight or vice versa, a precursor to the development of diabetes as it is a component of the metabolic syndrome. Advised to F/U with PCP/Cardiology for treatment regimen.  \par \par ? PBC – ALP elevated with normal Tbil and AMA negative SAMMY+, ASMA+.  \par Re-ordered labs with  and  with M2 sent out to [facility].  \par 10/12/2021 labs show Indeterminate Atypical ANCA, 1:80 ASMA, 1:320 Homogenous SAMMY, Negative anti-Soluble Liver Ag 1.3 and anti-LKM, Advised to c/w Ursodiol 500 mg BID for now. \par > No fibrosis on FS. Re-discussed the Liver Biopsy and willing to do it. \par \par # DILI – Still on psychiatrist meds and off all the over 5 OTC vitamins and supplements Meds which is avoidable for now and recheck labs in a month. Discussed the LiverTox likelihood scoring with Amitriptyline- B, Metoprolol –D, Cyclobenzaprine and Sumatriptan – E.  \par C/o Migraines- on Sumatriptan for breakthrough episodes, advised to F/U with her neurology MD. \par \par # COL/EGD – Denies any FH/O Liver disease or GI cancers. No urgent s/s  \par \par # Immunity – Immune to HAV (09/07/2021), not to HBV (11/18/2021). She completed the 2 dose COVID vaccine with no adverse effects in April 2021. S/p COVID infection in Feb 2021. She would like to get the INJ with the PCP. Will check for HAV antibodies with the next set of labs. \par \par # Renal cyst – Simple cyst incidentally found in the [place] ab; no further intervention required. Fs/U Urology Dr. Seb Hussein. \par \par PLAN to F/U in 3 months for RPA with labs. \par Encouraged to call back in the interim with any issues or concerns so that we can address and assist as required.

## 2023-03-23 ENCOUNTER — RX RENEWAL (OUTPATIENT)
Age: 53
End: 2023-03-23

## 2023-03-28 ENCOUNTER — OUTPATIENT (OUTPATIENT)
Dept: OUTPATIENT SERVICES | Facility: HOSPITAL | Age: 53
LOS: 1 days | End: 2023-03-28
Payer: MEDICAID

## 2023-03-28 ENCOUNTER — APPOINTMENT (OUTPATIENT)
Dept: ULTRASOUND IMAGING | Facility: IMAGING CENTER | Age: 53
End: 2023-03-28
Payer: MEDICAID

## 2023-03-28 DIAGNOSIS — R74.8 ABNORMAL LEVELS OF OTHER SERUM ENZYMES: ICD-10-CM

## 2023-03-28 PROCEDURE — 88307 TISSUE EXAM BY PATHOLOGIST: CPT

## 2023-03-28 PROCEDURE — 47000 NEEDLE BIOPSY OF LIVER PERQ: CPT

## 2023-03-28 PROCEDURE — 88307 TISSUE EXAM BY PATHOLOGIST: CPT | Mod: 26

## 2023-03-28 PROCEDURE — 76942 ECHO GUIDE FOR BIOPSY: CPT

## 2023-03-28 PROCEDURE — 76942 ECHO GUIDE FOR BIOPSY: CPT | Mod: 26

## 2023-03-28 PROCEDURE — 88313 SPECIAL STAINS GROUP 2: CPT

## 2023-03-28 PROCEDURE — 88313 SPECIAL STAINS GROUP 2: CPT | Mod: 26

## 2023-05-09 ENCOUNTER — APPOINTMENT (OUTPATIENT)
Dept: RHEUMATOLOGY | Facility: CLINIC | Age: 53
End: 2023-05-09
Payer: MEDICAID

## 2023-05-09 ENCOUNTER — LABORATORY RESULT (OUTPATIENT)
Age: 53
End: 2023-05-09

## 2023-05-09 VITALS
DIASTOLIC BLOOD PRESSURE: 92 MMHG | OXYGEN SATURATION: 97 % | TEMPERATURE: 97.3 F | HEIGHT: 67 IN | SYSTOLIC BLOOD PRESSURE: 151 MMHG | HEART RATE: 91 BPM | WEIGHT: 208 LBS | BODY MASS INDEX: 32.65 KG/M2

## 2023-05-09 PROCEDURE — 99204 OFFICE O/P NEW MOD 45 MIN: CPT

## 2023-05-09 NOTE — ASSESSMENT
[FreeTextEntry1] : #Persistent nasal swelling/erythema/tenderness\par Denies any history of inflammatory eye disease/scleritis, ear pain or swelling, respiratory symptoms, rashes or joint swelling..\par The question of relapsing polychondritis has been raised by ENT but this seems to be an isolated event so far no other manifestations or involvement\par #SAMMY positive\par \par -we reached out to ENT's office to obtain records/office visit reports\par -Check ESR and CRP\par -Obtain subset serology for evaluation of positive SAMMY\par -Check G6PD\par -We will discuss a trial of colchicine after above\par (If okay from hepatology perspective)\par \par follow up after above

## 2023-05-09 NOTE — HISTORY OF PRESENT ILLNESS
[FreeTextEntry1] : 52-year-old woman referred for evaluation of nasal pain and swelling in the setting of positive SAMMY\par \par \par #Onset around 6 months ago\par swelling, redness and pain of the nose\par seen by PMD first, given antibiotics and cream- no improvement\par was sent to Dermatology who sent her to Allergy/ENT: no abnormalities noted on exam except for external swelling\par blood work showed +SAMMY, was sent to Rheumatology\par \par \par \par \par Rheumatologic ROS:\par - No alopecia\par - + dry eyes and dry mouth\par - No history of red/painful eye\par - No oral ulcers\par - No Raynaud's \par - No rashes or photosensitivity\par - occasional pain in the legs/knees\par no swelling \par - No morning stiffness\par - No miscarriages or pregnancy complications\par - No history of blood clots\par - No family history of auto-immune disease  \par \par

## 2023-05-09 NOTE — PHYSICAL EXAM
[General Appearance - Alert] : alert [General Appearance - In No Acute Distress] : in no acute distress [Auscultation Breath Sounds / Voice Sounds] : lungs were clear to auscultation bilaterally [Heart Sounds] : normal S1 and S2 [Musculoskeletal - Swelling] : no joint swelling seen [Impaired Insight] : insight and judgment were intact [FreeTextEntry1] : nose: Erythema, soft tissue swelling, mild tenderness

## 2023-05-18 ENCOUNTER — LABORATORY RESULT (OUTPATIENT)
Age: 53
End: 2023-05-18

## 2023-05-19 LAB
ALBUMIN SERPL ELPH-MCNC: 4.7 G/DL
ALP BLD-CCNC: 169 U/L
ALT SERPL-CCNC: 57 U/L
ANION GAP SERPL CALC-SCNC: 12 MMOL/L
AST SERPL-CCNC: 41 U/L
B2 GLYCOPROT1 AB SER QL: POSITIVE
BILIRUB SERPL-MCNC: 0.4 MG/DL
BUN SERPL-MCNC: 14 MG/DL
C3 SERPL-MCNC: 141 MG/DL
C4 SERPL-MCNC: 22 MG/DL
CALCIUM SERPL-MCNC: 10.8 MG/DL
CARDIOLIPIN AB SER IA-ACNC: NEGATIVE
CHLORIDE SERPL-SCNC: 103 MMOL/L
CO2 SERPL-SCNC: 25 MMOL/L
CREAT SERPL-MCNC: 0.73 MG/DL
CREAT SPEC-SCNC: 43 MG/DL
CREAT/PROT UR: 0.1 RATIO
CRP SERPL-MCNC: 14 MG/L
DEPRECATED KAPPA LC FREE/LAMBDA SER: 1.19 RATIO
DSDNA AB SER-ACNC: <12 IU/ML
EGFR: 98 ML/MIN/1.73M2
ENA RNP AB SER IA-ACNC: 0.3 AL
ENA SM AB SER IA-ACNC: <0.2 AL
ENA SS-A AB SER IA-ACNC: <0.2 AL
ENA SS-B AB SER IA-ACNC: <0.2 AL
ERYTHROCYTE [SEDIMENTATION RATE] IN BLOOD BY WESTERGREN METHOD: 27 MM/HR
G6PD SER-CCNC: 10.7 U/G HGB
GGT SERPL-CCNC: 30 U/L
HISTONE AB SER QL: 0.9 UNITS
IGA SER QL IEP: 318 MG/DL
IGG SER QL IEP: 1446 MG/DL
IGM SER QL IEP: 233 MG/DL
KAPPA LC CSF-MCNC: 2.16 MG/DL
KAPPA LC SERPL-MCNC: 2.56 MG/DL
POTASSIUM SERPL-SCNC: 4.6 MMOL/L
PROT SERPL-MCNC: 7.9 G/DL
PROT UR-MCNC: 5 MG/DL
SODIUM SERPL-SCNC: 141 MMOL/L
THYROPEROXIDASE AB SERPL IA-ACNC: 336 IU/ML

## 2023-05-22 LAB
ANA SER IF-ACNC: NEGATIVE
LKM AB SER QL IF: <20.1 UNITS

## 2023-05-23 ENCOUNTER — APPOINTMENT (OUTPATIENT)
Dept: RHEUMATOLOGY | Facility: CLINIC | Age: 53
End: 2023-05-23
Payer: MEDICAID

## 2023-05-23 VITALS
BODY MASS INDEX: 32.65 KG/M2 | TEMPERATURE: 98 F | OXYGEN SATURATION: 95 % | HEIGHT: 67 IN | HEART RATE: 94 BPM | DIASTOLIC BLOOD PRESSURE: 91 MMHG | SYSTOLIC BLOOD PRESSURE: 155 MMHG | WEIGHT: 208 LBS

## 2023-05-23 PROCEDURE — 99214 OFFICE O/P EST MOD 30 MIN: CPT

## 2023-05-23 NOTE — ASSESSMENT
[FreeTextEntry1] : #Persistent nasal swelling/erythema/tenderness\par Denies any history of inflammatory eye disease/scleritis, ear pain or swelling, respiratory symptoms, rashes or joint swelling..\par The question of relapsing polychondritis has been raised by allergist but this seems to be an isolated event so far no other manifestations or involvement\par #SAMMY positive- subset serology negative \par ANCA, aCL, LA, Sm, RNP, SSA/SSB negative\par borderline B2GP Ig M 23\par +TPO \par likely related to Hashimoto's, no evidence of SLE/CTD at this time \par of note, repeat SAMMY with hepatology was negative \par #Elevated CRP, ESR WNL when adjusted \par \par \par -spoke with allergist testing was unrevealing \par as per ENT notes, no abnormalities on scope\par -spoke with hepatology no CI for a trial of colchicine\par reviewed with patient and agreeable\par side effects discussed, if unable to tolerate bid to lower to once daily\par she will hold off on starting until seeing ENT for second opinion\par \par \par RTO in 3-4 weeks or earlier if needed \par

## 2023-05-23 NOTE — PHYSICAL EXAM
[General Appearance - Alert] : alert [General Appearance - In No Acute Distress] : in no acute distress [Respiration, Rhythm And Depth] : normal respiratory rhythm and effort [Musculoskeletal - Swelling] : no joint swelling seen [Impaired Insight] : insight and judgment were intact [FreeTextEntry1] : nasal erythema tenderness

## 2023-05-24 LAB
MITOCHONDRIA AB SER IF-ACNC: NORMAL
SMOOTH MUSCLE AB SER QL IF: ABNORMAL

## 2023-06-05 ENCOUNTER — APPOINTMENT (OUTPATIENT)
Dept: HEPATOLOGY | Facility: CLINIC | Age: 53
End: 2023-06-05
Payer: MEDICAID

## 2023-06-05 VITALS
WEIGHT: 205 LBS | BODY MASS INDEX: 32.18 KG/M2 | OXYGEN SATURATION: 100 % | SYSTOLIC BLOOD PRESSURE: 125 MMHG | DIASTOLIC BLOOD PRESSURE: 78 MMHG | RESPIRATION RATE: 16 BRPM | HEART RATE: 81 BPM | HEIGHT: 67 IN

## 2023-06-05 PROCEDURE — 99215 OFFICE O/P EST HI 40 MIN: CPT

## 2023-06-05 RX ORDER — URSODIOL 500 MG/1
500 TABLET ORAL
Qty: 60 | Refills: 11 | Status: DISCONTINUED | COMMUNITY
Start: 2022-03-16 | End: 2023-06-05

## 2023-06-06 NOTE — HISTORY OF PRESENT ILLNESS
[FreeTextEntry1] : Ms. FEMI LAM is 52-year-old female who presents with Liver enzymes elevations. She feels well. Denies c/o abdominal pain, pruritis, melena, No MH/O Liver diseases, clinical hepatitis, Jaundice. \par \par Social H/O denies alcohol use or smoking. Reports no high-risk occupation/behavior. \par MH/O- Pre-diabetic and SH/O- Cholecystectomy for Cholelithiasis. \par Denies any FH/O Liver disease or GI cancers.  \par \par Normal Bone density done on 11/02/2021 repeat recommended in 5 years- 2026. \par FS on 10/12/2021 shows F0-F1 – 4.5 kPa, S1 - Stage 1 - 11% to 33% \par \par Immunity – Immune to HAV (09/07/2021), not to HBV (11/18/2021), reports to have completed the 3-dose series with her PCP. She completed the 2 dose COVID vaccine with no adverse effects in April 2021. S/p COVID infection in Feb 2021. \par \par BW on 05/18/2023: AMA, / IgG\par \par Labs on 03/08/2023: Elevated AST/ALT 42/61, , A1C 6.1%, Tgl 164, Cho 256, , Non-, WDL- INR, CBC with noted Neut% Lymp%. \par \par US abs on 09/07/22 shows Enlarged, fatty liver. Cholecystectomy. 2.5 cm left renal cyst.  \par Labs on 09/06/22 shows elevated ALT 50,  with WDL- AST, Tbil, BMP, ASMA 1:80, AMA < 1:20, CBC, GGT, Low INR 0.87. \par Labs on 03/15/2022 shows elevated ALT 53,  with WDL-Tbil 0.6, AST 40, BMP, INR, and CBC. \par \par Labs on 11/18/2021 shows elevated  with AST/ALT 33/43/0.3 Tbil, WDL- GGT, 1:80 ASMA, , Hb/Hct 13.7/42.8. \par \par 10/12/2021 labs show Indeterminate Atypical ANCA, 1:80 ASMA, 1:320 Homogenous SAMMY, Negative anti-Soluble Liver Ag 1.3 and anti-LKM, High Vit A 70 and Insufficient Vit D 29.6. ALP Isoenzymes shows LBI-43/45/12% \par Labs on 09/07/21 shows elevated ALT 57, , WDL- AST 38, Tbil 0.3, CBC, INR, Non-reactive acute Hepatitis A, B and C. A1C 6.3%, Hyperlipidemia with Cho/Tgl 210/278. \par \par Labs in 08/24/2021 shows AST/ALT 41/53,  with WDL Tbil 0.4. ASMA 1:80.  \par US ab on 06/11/2021 shows Hepatic steatosis, Hepatomegaly, Left renal cyst. \par \par

## 2023-06-06 NOTE — ASSESSMENT
[FreeTextEntry1] : Ms. FEMI LAM is 52-year-old female with Liver enzymes elevations. \par \par BW on 05/18/2023: Elevated ASMA 1:80, AST/ALT 41/57,  with WDL-Tbil, GGT, Negative anti-/ IgG, AMA, AM2A, SAMMY, anti-LMK, Noted Ca+ 10.8 and Kappa 2.56. LBx on 03/28/2023: 1A Fibrosis stage. NAFLD Activity score (CHRISTIANNE) 2 to 3/8. \par US abs on 09/07/22 shows Enlarged, fatty liver. Cholecystectomy. 2.5 cm left renal cyst. \par \par + Elevated Liver enzymes – Labs reviewed and with no changes and no etiology for the elevation from the labs and imaging.\par \par # Fatty Liver+ in LBx and US with +Hepatic steatosis, scored +S3 steatosis in FS. Elevated liver enzymes most probably r/t fatty liver CHRISTIANNE on LBx and +MetS, Advised to F/U with PCP for c/w Ozempic SQ weekly dose, written down the instructions for up titration to a max of 2mg weekly INJ. \par \par + GLP1 - I discussed that semaglutide helps to improve diabetes type 2, obesity, and TAPIA. I discussed that, when used in addition to exercise and reduced caloric diet, weight loss in patients with obesity was 15% without diabetes and 10% with diabetes after 68 weeks. I discussed that a rare type of thyroid cancer, medullary/C-cell thyroid cancer, have been found in animals treated with this class of medications. The patient does not have a family history of this cancer. \par \par # Metabolic syndrome- Elevated diabetic A1C, Hyperlipidemia with ^Cho/Tgl, BMI > 30, HTN. \par Reviewed the spectrum of disease, the risk of disease progression with added risk factors commonly seen in patients with diabetes or those who are overweight or vice versa, a precursor to the development of diabetes as it is a component of the metabolic syndrome. Advised to F/U with PCP/Cardiology for treatment regimen. \par \par # AILD – Reviewed ALP elevated with normal Tbil and AMA negative SAMMY+, ASMA+ Least likely from AI etiology. \par > Reviewed the normal  and  with M2 sent out to [facility]. \par 10/12/2021 labs show Indeterminate Atypical ANCA, 1:80 ASMA, 1:320 Homogenous SAMMY, Negative anti-Soluble Liver Ag 1.3 and anti-LKM, \par > Advised to c/w Ursodiol 500 mg BID. \par > No fibrosis on FS. \par > Reviewed the Liver Biopsy pathology with no evidence of AIH.\par \par # DILI – Remains on meds from psychiatrist. Discussed the LiverTox likelihood scoring with Amitriptyline- B, Metoprolol –D, Cyclobenzaprine and Sumatriptan – E. \par + C/o Migraines- on Sumatriptan for breakthrough episodes, advised to F/U with her neurology MD. \par \par + Renal cyst – Simple cyst incidentally found in the [place] ab; no further intervention required. Fs/U Urology Dr. Seb Hussein. \par \par + Immune to HAV (09/07/2021) and HBV (03/08/2023) s/p INJ with PCP. She completed the 2 dose COVID vaccine with no adverse effects in April 2021. S/p COVID infection in Feb 2021. \par \par + COL/EGD – Denies any FH/O Liver disease or GI cancers. Routine COL to be done as per PCP referral.\par \par PLAN to F/U in 3 months for RPA with labs. \par Encouraged to call back in the interim with any issues or concerns so that we can address and assist as required. \par

## 2023-06-22 ENCOUNTER — APPOINTMENT (OUTPATIENT)
Dept: OTOLARYNGOLOGY | Facility: CLINIC | Age: 53
End: 2023-06-22

## 2023-06-27 ENCOUNTER — APPOINTMENT (OUTPATIENT)
Dept: RHEUMATOLOGY | Facility: CLINIC | Age: 53
End: 2023-06-27

## 2023-08-03 ENCOUNTER — APPOINTMENT (OUTPATIENT)
Dept: OTOLARYNGOLOGY | Facility: CLINIC | Age: 53
End: 2023-08-03
Payer: MEDICAID

## 2023-08-03 VITALS
OXYGEN SATURATION: 97 % | HEIGHT: 67 IN | SYSTOLIC BLOOD PRESSURE: 138 MMHG | HEART RATE: 81 BPM | WEIGHT: 200 LBS | BODY MASS INDEX: 31.39 KG/M2 | DIASTOLIC BLOOD PRESSURE: 86 MMHG

## 2023-08-03 DIAGNOSIS — R49.0 DYSPHONIA: ICD-10-CM

## 2023-08-03 PROCEDURE — 99244 OFF/OP CNSLTJ NEW/EST MOD 40: CPT | Mod: 25

## 2023-08-03 PROCEDURE — 31231 NASAL ENDOSCOPY DX: CPT

## 2023-08-03 RX ORDER — ERGOCALCIFEROL 1.25 MG/1
1.25 MG CAPSULE, LIQUID FILLED ORAL
Qty: 4 | Refills: 0 | Status: COMPLETED | COMMUNITY
Start: 2021-09-04 | End: 2023-08-03

## 2023-08-03 RX ORDER — SEMAGLUTIDE 1.34 MG/ML
2 INJECTION, SOLUTION SUBCUTANEOUS
Qty: 2 | Refills: 0 | Status: COMPLETED | COMMUNITY
Start: 2022-10-31 | End: 2023-08-03

## 2023-08-03 RX ORDER — LORATADINE 10 MG/1
10 TABLET ORAL
Qty: 30 | Refills: 0 | Status: COMPLETED | COMMUNITY
Start: 2022-02-28 | End: 2023-08-03

## 2023-08-03 NOTE — HISTORY OF PRESENT ILLNESS
[de-identified] : 53 year old female presents for evaluation of nasal swelling Referred by Dr Adrinae Hernandez +SAMMY 6+ months of  intermittent nasal pain, swelling and redness to the tip of her nose- seen by dermatology and Dr. Crowley [facility] who noted normal exam-- started on PO augmentin several mo ago Intermittent nasal congestion, anterior rhinorrhea and frontal pressure Frequent headaches- takes sumatriptan with relief  Denies PND, epistaxis, recurrent sinus infections  Sense of smell fluctuates- overall decreased since COVID+ in 2020 Used Flonase as needed for months without relief  No use of OTC allergy medications No scans of sinuses + intermittent hoarseness Denies dyspnea or dysphagia  PMH: HTN, +SAMMY PSH: cholecystectomy, tubal ligation

## 2023-08-08 ENCOUNTER — APPOINTMENT (OUTPATIENT)
Dept: OTOLARYNGOLOGY | Facility: CLINIC | Age: 53
End: 2023-08-08
Payer: MEDICAID

## 2023-08-08 VITALS
WEIGHT: 200 LBS | HEIGHT: 67 IN | BODY MASS INDEX: 31.39 KG/M2 | HEART RATE: 82 BPM | DIASTOLIC BLOOD PRESSURE: 86 MMHG | SYSTOLIC BLOOD PRESSURE: 129 MMHG

## 2023-08-08 DIAGNOSIS — R49.0 DYSPHONIA: ICD-10-CM

## 2023-08-08 PROCEDURE — 31579 LARYNGOSCOPY TELESCOPIC: CPT

## 2023-08-08 PROCEDURE — 99213 OFFICE O/P EST LOW 20 MIN: CPT | Mod: 25

## 2023-08-08 NOTE — PROCEDURE
[de-identified] : Stroboscopic Laryngoscopy Procedure Note:  Indication:	Assess laryngeal biomechanics and vocal fold oscillation.  Description of Procedure:	Informed consent was verbally obtained from the patient prior to the procedure. The patient was seated in the clinic chair. Topical anesthesia was achieved by first spraying the nasal cavities with 4% lidocaine and nasal decongestant.   Findings:  Supraglottis: no masses or lesions  Glottis:    Structure:                        Right: crisp and shows no lesions or masses                        Left:  crisp and shows no lesions or masses                 Mobility:                        Right:  normal                        Left:  normal                Amplitude:                        Right:  normal                       Left:  normal                Closure: complete                 Wave symmetry:  asymmetric most frequencies  Subglottis: no masses or lesions within the visualized subglottis Visualized airway is widely patent. Other: Mod MTD

## 2023-08-08 NOTE — CONSULT LETTER
[Dear  ___] : Dear  [unfilled], [Consult Letter:] : I had the pleasure of evaluating your patient, [unfilled]. [Consult Closing:] : Thank you very much for allowing me to participate in the care of this patient.  If you have any questions, please do not hesitate to contact me. [Sincerely,] : Sincerely, [FreeTextEntry2] : Dr Deangelo Lancaster [FreeTextEntry3] : [facility] M.D. Division of Laryngology | Department of Otolaryngology [address] [address]

## 2023-08-08 NOTE — HISTORY OF PRESENT ILLNESS
[de-identified] : FEMI LAM is a 53 year old woman who presents to the [facility] with difficulty phonating. Patient is referred by Dr. Lancaster who last saw the patient on 8/3/23.  At that time he noted a slight irregularity vs. mucus overlying the left vocal process.  She now does note periods of normal voicing. She does NOT note specific difficulties with swallowing. She does note frequent classic heartburn symptoms. She does not take any medications for heartburn; she watches her diet and does not eat before bedtime. She is a former smoker, quit about 15 years ago.  VOCAL DEMANDS: Her vocal demands are primarily those of general conversation. 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

## 2023-08-08 NOTE — REASON FOR VISIT
[Initial Consultation] : an initial consultation for [FreeTextEntry2] : Referred by Dr Lancaster for difficulty phonating

## 2023-08-08 NOTE — ASSESSMENT
[FreeTextEntry1] : Assessment/Plan:  #1 Muscle tension dysphonia   She was offered voice therapy but would like to defer for the time being.  She will reach out to schedule if she changes her mind.

## 2023-08-14 ENCOUNTER — APPOINTMENT (OUTPATIENT)
Dept: RHEUMATOLOGY | Facility: CLINIC | Age: 53
End: 2023-08-14

## 2023-08-31 LAB
ALBUMIN SERPL ELPH-MCNC: 4.7 G/DL
ALP BLD-CCNC: 162 U/L
ALT SERPL-CCNC: 132 U/L
ANION GAP SERPL CALC-SCNC: 11 MMOL/L
AST SERPL-CCNC: 74 U/L
BILIRUB SERPL-MCNC: 0.4 MG/DL
BUN SERPL-MCNC: 13 MG/DL
CALCIUM SERPL-MCNC: 10.2 MG/DL
CHLORIDE SERPL-SCNC: 104 MMOL/L
CHOLEST SERPL-MCNC: 232 MG/DL
CO2 SERPL-SCNC: 25 MMOL/L
CREAT SERPL-MCNC: 0.78 MG/DL
EGFR: 90 ML/MIN/1.73M2
ESTIMATED AVERAGE GLUCOSE: 134 MG/DL
HBA1C MFR BLD HPLC: 6.3 %
HDLC SERPL-MCNC: 50 MG/DL
INR PPP: 0.86 RATIO
LDLC SERPL CALC-MCNC: 143 MG/DL
NONHDLC SERPL-MCNC: 182 MG/DL
POTASSIUM SERPL-SCNC: 4.6 MMOL/L
PROT SERPL-MCNC: 7.4 G/DL
PT BLD: 9.8 SEC
SODIUM SERPL-SCNC: 139 MMOL/L
TRIGL SERPL-MCNC: 214 MG/DL

## 2023-09-12 ENCOUNTER — APPOINTMENT (OUTPATIENT)
Dept: HEPATOLOGY | Facility: CLINIC | Age: 53
End: 2023-09-12
Payer: COMMERCIAL

## 2023-09-12 PROCEDURE — 76981 USE PARENCHYMA: CPT

## 2023-09-12 PROCEDURE — 99215 OFFICE O/P EST HI 40 MIN: CPT

## 2023-09-19 ENCOUNTER — APPOINTMENT (OUTPATIENT)
Dept: RHEUMATOLOGY | Facility: CLINIC | Age: 53
End: 2023-09-19
Payer: COMMERCIAL

## 2023-09-19 VITALS
OXYGEN SATURATION: 98 % | WEIGHT: 200 LBS | DIASTOLIC BLOOD PRESSURE: 82 MMHG | HEART RATE: 71 BPM | BODY MASS INDEX: 31.39 KG/M2 | HEIGHT: 67 IN | SYSTOLIC BLOOD PRESSURE: 134 MMHG

## 2023-09-19 DIAGNOSIS — R06.09 OTHER FORMS OF DYSPNEA: ICD-10-CM

## 2023-09-19 DIAGNOSIS — R22.0 LOCALIZED SWELLING, MASS AND LUMP, HEAD: ICD-10-CM

## 2023-09-19 PROCEDURE — 99214 OFFICE O/P EST MOD 30 MIN: CPT

## 2023-10-31 ENCOUNTER — APPOINTMENT (OUTPATIENT)
Dept: CT IMAGING | Facility: CLINIC | Age: 53
End: 2023-10-31
Payer: COMMERCIAL

## 2023-10-31 ENCOUNTER — OUTPATIENT (OUTPATIENT)
Dept: OUTPATIENT SERVICES | Facility: HOSPITAL | Age: 53
LOS: 1 days | End: 2023-10-31
Payer: COMMERCIAL

## 2023-10-31 DIAGNOSIS — R06.09 OTHER FORMS OF DYSPNEA: ICD-10-CM

## 2023-10-31 PROCEDURE — 71250 CT THORAX DX C-: CPT | Mod: 26

## 2023-10-31 PROCEDURE — 71250 CT THORAX DX C-: CPT

## 2024-03-08 LAB
ALBUMIN SERPL ELPH-MCNC: 4.6 G/DL
ALP BLD-CCNC: 139 U/L
ALT SERPL-CCNC: 42 U/L
ANION GAP SERPL CALC-SCNC: 12 MMOL/L
AST SERPL-CCNC: 29 U/L
BILIRUB SERPL-MCNC: 0.5 MG/DL
BUN SERPL-MCNC: 11 MG/DL
CALCIUM SERPL-MCNC: 10.1 MG/DL
CHLORIDE SERPL-SCNC: 103 MMOL/L
CHOLEST SERPL-MCNC: 224 MG/DL
CO2 SERPL-SCNC: 25 MMOL/L
CREAT SERPL-MCNC: 0.87 MG/DL
EGFR: 79 ML/MIN/1.73M2
ESTIMATED AVERAGE GLUCOSE: 131 MG/DL
GGT SERPL-CCNC: 26 U/L
HBA1C MFR BLD HPLC: 6.2 %
HDLC SERPL-MCNC: 59 MG/DL
LDLC SERPL CALC-MCNC: 144 MG/DL
NONHDLC SERPL-MCNC: 165 MG/DL
POTASSIUM SERPL-SCNC: 4.9 MMOL/L
PROT SERPL-MCNC: 7.7 G/DL
SODIUM SERPL-SCNC: 140 MMOL/L
TRIGL SERPL-MCNC: 117 MG/DL

## 2024-03-12 ENCOUNTER — APPOINTMENT (OUTPATIENT)
Dept: HEPATOLOGY | Facility: CLINIC | Age: 54
End: 2024-03-12
Payer: COMMERCIAL

## 2024-03-12 VITALS
HEIGHT: 67 IN | WEIGHT: 200 LBS | OXYGEN SATURATION: 98 % | SYSTOLIC BLOOD PRESSURE: 135 MMHG | HEART RATE: 80 BPM | TEMPERATURE: 97.8 F | DIASTOLIC BLOOD PRESSURE: 84 MMHG | BODY MASS INDEX: 31.39 KG/M2

## 2024-03-12 DIAGNOSIS — R74.8 ABNORMAL LEVELS OF OTHER SERUM ENZYMES: ICD-10-CM

## 2024-03-12 DIAGNOSIS — N28.1 CYST OF KIDNEY, ACQUIRED: ICD-10-CM

## 2024-03-12 DIAGNOSIS — K76.0 FATTY (CHANGE OF) LIVER, NOT ELSEWHERE CLASSIFIED: ICD-10-CM

## 2024-03-12 DIAGNOSIS — R76.8 OTHER SPECIFIED ABNORMAL IMMUNOLOGICAL FINDINGS IN SERUM: ICD-10-CM

## 2024-03-12 DIAGNOSIS — Z78.9 OTHER SPECIFIED HEALTH STATUS: ICD-10-CM

## 2024-03-12 DIAGNOSIS — K75.81 NONALCOHOLIC STEATOHEPATITIS (NASH): ICD-10-CM

## 2024-03-12 PROCEDURE — 99215 OFFICE O/P EST HI 40 MIN: CPT

## 2024-03-12 RX ORDER — COLCHICINE 0.6 MG/1
0.6 TABLET ORAL TWICE DAILY
Qty: 60 | Refills: 0 | Status: DISCONTINUED | COMMUNITY
Start: 2023-05-23 | End: 2024-03-12

## 2024-03-13 PROBLEM — R76.8 ANA POSITIVE: Status: ACTIVE | Noted: 2023-05-09

## 2024-03-13 PROBLEM — K75.81 NASH (NONALCOHOLIC STEATOHEPATITIS): Status: ACTIVE | Noted: 2023-06-06

## 2024-03-13 PROBLEM — N28.1 RENAL CYST: Status: ACTIVE | Noted: 2021-09-17

## 2024-03-13 PROBLEM — Z78.9 IMMUNE TO HEPATITIS A: Status: ACTIVE | Noted: 2023-06-06

## 2024-03-13 PROBLEM — Z78.9 IMMUNE TO HEPATITIS B: Status: ACTIVE | Noted: 2023-06-06

## 2024-03-13 LAB
AST SERPL W P-5'-P-CCNC: 33 IU/L
CHOLEST SERPL-MCNC: 234 MG/DL
COMMENT:: NORMAL
FIBROSIS STAGE SERPL QL: NORMAL
FIBROSURE ALPHA 2 MACROGLOBULINS: 151 MG/DL
FIBROSURE ALT (SGPT): 46 IU/L
FIBROSURE APOLIPOPROTEIN A1: 159 MG/DL
FIBROSURE GGT: 26 IU/L
FIBROSURE HAPTOGLOBIN: 126 MG/DL
FIBROSURE SCORING: NORMAL
FIBROSURE TOTAL BILIRUBIN: 0.5 MG/DL
GLUCOSE SERPL-MCNC: 111 MG/DL
INTERPRETATIONS:: NORMAL
LIVER FIBR SCORE SERPL CALC.FIBROSURE: 0.1
Lab: NORMAL
NASH SCORING: NORMAL
NECROINFLAMMATORY ACT GRADE SERPL QL: NORMAL
NECROINFLAMMATORY ACT SCORE SERPL: 0.49
SERVICE CMNT-IMP: NORMAL
STEATOSIS GRADE: NORMAL
STEATOSIS SCORE: 0.61
STEATOSIS SCORING: NORMAL
TRIGL SERPL-MCNC: 117 MG/DL

## 2024-03-13 NOTE — ASSESSMENT
[FreeTextEntry1] : Ms. FEMI LAM is 53-year-old female with Liver enzymes elevations and MASLD.  BW on 03/07/2024: ALP < 139 with normlized AST/ALT/Tgl; S2-S3, N1, F0 scored on TAPIA FibroSure+ ALT 46, CHO < 224, , Non-, GLucose 111, A1C 6.2% WDL- GGT. LBx on 03/28/2023: 1A Fibrosis stage. NAFLD Activity score (CHRISTIANNE) 2 to 3/8.  # MASLD + Hepatic steatosis in LBx and US with scored +S3 steatosis in FS. Elevated liver enzymes most probably r/t fatty liver CHRISTIANNE on LBx. + 1A Fibrosis stage c/t No fibrosis on FS.  + Contributing factors: Elevated diabetic A1C, Hyperlipidemia with ^Cho/Tgl,  + HTN on Metoprolol ER 50 mg OD. + BMI > 31.32 - Advised to F/U with PCP for c/w Ozempic SQ weekly dose, written down the instructions for up titration to a max of 2mg weekly INJ. Semaglutide helps to improve diabetes type 2, obesity, and TAPIA. I discussed that, when used in addition to exercise and reduced caloric diet, weight loss in patients with obesity was 15% without diabetes and 10% with diabetes after 68 weeks. - Re-discussed that Ozempic as the Interval events on GLP1 with c/o N/V since 06/17/2023 when she took her Ozempic SQ weekly INJ. Found to be COIVD+ in the ED while hydrated with IVF. Most probably r/t COVID least likely r/t Ozempic. Unwilling to try any GLP1 at this time. Will re-discuss with next visit when she want to bring her daughter.   # Elevated Liver enzymes - Lowest ALP reviewed since elevations co-relating with healhty dieting and stable weight. - Reviewed the specificity/sensitivity of Liver Biopsy pathology results with no evidence of AIH. - Reviewed ALP elevated with normal TB, GGT and AMA negative SAMMY+, ASMA+ Least likely from AI etiology. - Reviewed the normal  and  with M2 sent out to [place] labs. Reviewed the 10/12/2021 labs show Indeterminate Atypical ANCA, 1:80 ASMA, 1:320 Homogenous SAMMY, Negative anti-Soluble Liver Ag 1.3 and anti-LKM, - PH/o D/C Ursodiol 500 mg BID. Does not like to be on meds if not seem to help. And no evidence of PBC as suspected in the LBx, Elevated +ASMA 1:80 with elevated +ALP. Could re-try the Ursodiol Tx for 6 months with next visit if willing.   + Renal cyst - Simple cyst incidentally found in the US ab; no further intervention required. Fs/U Urology Dr. Seb Hussein. Simple cysts are sacs filled with fluid. The exact cause of cysts is unknown but cysts are more common with advancing age, they are usually asymptomatic and do not require any treatment. Very large renal cysts can cause dull pain or discomfort. Cysts can rarely become infected, develop bleeding, rupture or impaired function. Surgical removal or drainage and sclerotherapy of renal cysts can be performed in specific clinical settings.  + C/o Migraines- Was c/o dizziness, advised to c/w Sumatriptan for breakthrough episodes, advised to F/U with her neurology MD. - Remains on meds from psychiatrist. Discussed the LiverTox likelihood scoring with Amitriptyline- B.  # RHM:  + COL - Denies any FH/O Liver disease or GI cancers. Routine COL to be done as per PCP referral. + Immune to HAV (09/07/2021) and HBV (03/08/2023) s/p INJ with PCP. She completed the 2 dose COVID vaccine with no adverse effects in April 2021. S/p COVID infection in Feb 2021.  PLAN to F/U in 6 months for RPA with US ab, fasting labs (to re-discuss the GLP1 trial) and FS with RPA. Encouraged to call back in the interim with any issues or concerns so that we can address and assist as required.

## 2024-03-13 NOTE — HISTORY OF PRESENT ILLNESS
[FreeTextEntry1] : Ms. FEMI LAM is 53-year-old female who presents with Liver enzymes elevations and LBx proven fatty liver. She feels well. Denies c/o abdominal pain, pruritis, melena, No MH/O Liver diseases, clinical hepatitis, Jaundice.   Ph/o: D/C GLP1 with c/o N/V Found to be COIVD+ in the ED while hydrated with IVF. Started Ozempic SQ weekly INJ since 06/17/2023. MH/O- Pre-diabetic and SH/O- Cholecystectomy for Cholelithiasis.  Denies any FH/O Liver disease or GI cancers.   Social H/O denies alcohol use or smoking. Reports no high-risk occupation/behavior.  Fibroscan on 09/12/2023 shows F0-1 (E 4.7 kPa) and S2 ( dB/m) LBx on 03/28/2023: CHRISTIANNE score Steatosis grade 1/Fibrosis stage 1A. Minimal steatohepatitis in background of mild macro vesicular steatosis. FS on 10/12/2021 shows F0-F1 - 4.5 kPa, S1 - Stage 1 - 11% to 33%  Normal Bone density done on 11/02/2021 repeat recommended in 5 years- 2026.  + Immune to HBV (03/08/2023), s/p 3-dose series with her PCP [facility] (09/07/2021). She completed the 2 dose COVID vaccine with no adverse effects in April 2021. S/p COVID infection in Feb 2021.   BW on 03/07/2024: ALP < 139 with normlized AST/ALT/Tgl; S2-S3, N1, F0 scored on TAPIA FibroSure+ ALT 46, CHO < 224, , Non-, GLucose 111, A1C 6.2% WDL- GGT.  BW on 08/30/2023: , AST/ALT 74/132, Tgl 214, Cho 232, HDL 50, , Non-, A1C 6.3% WDL-TB, INR, CBC. BW on 05/18/2023: AMA, / IgG Labs on 03/08/2023: Elevated AST/ALT 42/61, , A1C 6.1%, Tgl 164, Cho 256, , Non-, WDL- INR, CBC with noted Neut% Lymp%.  US abs on 09/07/22 shows Enlarged, fatty liver. Cholecystectomy. 2.5 cm left renal cyst.   Labs on 09/06/22 shows elevated ALT 50,  with WDL- AST, Tbil, BMP, ASMA 1:80, AMA < 1:20, CBC, GGT, Low INR 0.87.  Labs on 03/15/2022 shows elevated ALT 53,  with WDL-Tbil 0.6, AST 40, BMP, INR, and CBC.  Labs on 11/18/2021 shows elevated  with AST/ALT 33/43/0.3 Tbil, WDL- GGT, 1:80 ASMA, , Hb/Hct 13.7/42.8.  BW on 10/12/2021: Indeterminate Atypical ANCA, 1:80 ASMA, 1:320 Homogenous SAMMY, Negative anti-Soluble Liver Ag 1.3 and anti-LKM, High Vit A 70 and Insufficient Vit D 29.6. ALP Isoenzymes shows LBI-43/45/12%  Labs on 09/07/21 shows elevated ALT 57, , WDL- AST 38, Tbil 0.3, CBC, INR, Non-reactive acute Hepatitis A, B and C. A1C 6.3%, Hyperlipidemia with Cho/Tgl 210/278. Labs in 08/24/2021 shows AST/ALT 41/53,  with WDL Tbil 0.4. ASMA 1:80.   US ab on 06/11/2021 shows Hepatic steatosis, Hepatomegaly, Left renal cyst.

## 2024-03-13 NOTE — PHYSICAL EXAM
[Scleral Icterus] : No Scleral Icterus [Spider Angioma] : No spider angioma(s) were observed [Abdominal  Ascites] : no ascites [Asterixis] : no asterixis observed [Jaundice] : No jaundice [Palmar Erythema] : no Palmar Erythema [Depression] : no depression [General Appearance - Alert] : alert [General Appearance - Well Nourished] : well nourished [General Appearance - Well-Appearing] : healthy appearing [Sclera] : the sclera and conjunctiva were normal [Neck Appearance] : the appearance of the neck was normal [Neck Cervical Mass (___cm)] : no neck mass was observed [Exaggerated Use Of Accessory Muscles For Inspiration] : no accessory muscle use [Apical Impulse] : the apical impulse was normal [Heart Sounds] : normal S1 and S2 [Edema] : there was no peripheral edema [Veins - Varicosity Changes] : there were no varicosital changes [Bowel Sounds] : normal bowel sounds [Abdomen Mass (___ Cm)] : no abdominal mass palpated [Abdomen Hernia] : no hernia was discovered [Cervical Lymph Nodes Enlarged Posterior Bilaterally] : posterior cervical [Supraclavicular Lymph Nodes Enlarged Bilaterally] : supraclavicular [Nail Clubbing] : no clubbing  or cyanosis of the fingernails [Involuntary Movements] : no involuntary movements were seen [] : no rash [Sensation] : the sensory exam was normal to light touch and pinprick [Skin Lesions] : no skin lesions [No Focal Deficits] : no focal deficits [Oriented To Time, Place, And Person] : oriented to person, place, and time

## 2024-08-20 ENCOUNTER — APPOINTMENT (OUTPATIENT)
Dept: ULTRASOUND IMAGING | Facility: CLINIC | Age: 54
End: 2024-08-20
Payer: COMMERCIAL

## 2024-08-20 ENCOUNTER — OUTPATIENT (OUTPATIENT)
Dept: OUTPATIENT SERVICES | Facility: HOSPITAL | Age: 54
LOS: 1 days | End: 2024-08-20
Payer: COMMERCIAL

## 2024-08-20 DIAGNOSIS — K76.0 FATTY (CHANGE OF) LIVER, NOT ELSEWHERE CLASSIFIED: ICD-10-CM

## 2024-08-20 PROCEDURE — 76700 US EXAM ABDOM COMPLETE: CPT | Mod: 26

## 2024-08-20 PROCEDURE — 76700 US EXAM ABDOM COMPLETE: CPT

## 2024-10-03 ENCOUNTER — APPOINTMENT (OUTPATIENT)
Dept: HEPATOLOGY | Facility: CLINIC | Age: 54
End: 2024-10-03
Payer: COMMERCIAL

## 2024-10-03 ENCOUNTER — APPOINTMENT (OUTPATIENT)
Dept: HEPATOLOGY | Facility: CLINIC | Age: 54
End: 2024-10-03

## 2024-10-03 VITALS
TEMPERATURE: 96.3 F | WEIGHT: 202 LBS | HEIGHT: 67 IN | HEART RATE: 76 BPM | DIASTOLIC BLOOD PRESSURE: 74 MMHG | BODY MASS INDEX: 31.71 KG/M2 | OXYGEN SATURATION: 98 % | RESPIRATION RATE: 16 BRPM | SYSTOLIC BLOOD PRESSURE: 127 MMHG

## 2024-10-03 DIAGNOSIS — E78.00 PURE HYPERCHOLESTEROLEMIA, UNSPECIFIED: ICD-10-CM

## 2024-10-03 DIAGNOSIS — K75.81 NONALCOHOLIC STEATOHEPATITIS (NASH): ICD-10-CM

## 2024-10-03 DIAGNOSIS — K76.0 FATTY (CHANGE OF) LIVER, NOT ELSEWHERE CLASSIFIED: ICD-10-CM

## 2024-10-03 DIAGNOSIS — R76.8 OTHER SPECIFIED ABNORMAL IMMUNOLOGICAL FINDINGS IN SERUM: ICD-10-CM

## 2024-10-03 DIAGNOSIS — N28.1 CYST OF KIDNEY, ACQUIRED: ICD-10-CM

## 2024-10-03 DIAGNOSIS — Z78.9 OTHER SPECIFIED HEALTH STATUS: ICD-10-CM

## 2024-10-03 DIAGNOSIS — R74.8 ABNORMAL LEVELS OF OTHER SERUM ENZYMES: ICD-10-CM

## 2024-10-03 PROCEDURE — 76981 USE PARENCHYMA: CPT

## 2024-10-03 PROCEDURE — 99215 OFFICE O/P EST HI 40 MIN: CPT

## 2024-10-03 RX ORDER — ROSUVASTATIN CALCIUM 10 MG/1
10 TABLET, FILM COATED ORAL
Qty: 90 | Refills: 3 | Status: ACTIVE | COMMUNITY
Start: 2024-10-03

## 2024-10-04 NOTE — ASSESSMENT
[FreeTextEntry1] : Ms. FEMI LAM is 53-year-old female with Liver enzymes elevations and MASLD.  BW on 09/21/2024: F0 N2 S1 on TAPIA Fibrosure+ Isolated + ALP < 130, A1c 6.3% WDL- CMP, Lipids (first time), + Hepatic steatosis on 08/20/2024 US ab.  # MASLD + Hepatic steatosis in LBx and US with scored +S3 steatosis in FS. Elevated liver enzymes most probably r/t fatty liver CHRISTIANNE on LBx. + 1A Fibrosis stage c/t No fibrosis on FS.  + Contributing factors: Elevated diabetic A1C, Hyperlipidemia with ^Cho/Tgl,  + HTN on Metoprolol ER 50 mg OD. + BMI > 31.32 - Advised to F/U with PCP for c/w Ozempic SQ weekly dose, written down the instructions for up titration to a max of 2mg weekly INJ. Semaglutide helps to improve diabetes type 2, obesity, and TAPIA. I discussed that, when used in addition to exercise and reduced caloric diet, weight loss in patients with obesity was 15% without diabetes and 10% with diabetes after 68 weeks. - Re-discussed that Ozempic as the Interval events on GLP1 with c/o N/V since 06/17/2023 when she took her Ozempic SQ weekly INJ. Found to be COIVD+ in the ED while hydrated with IVF. Most probably r/t COVID least likely r/t Ozempic. Unwilling to try any GLP1 at this time. Will re-discuss with next visit when she want to bring her daughter.   # Elevated Liver enzymes - Lowest ALP reviewed since elevations co-relating with healhty dieting and stable weight. - Reviewed the specificity/sensitivity of Liver Biopsy pathology results with no evidence of AIH. - Reviewed ALP elevated with normal TB, GGT and AMA negative SAMMY+, ASMA+ Least likely from AI etiology. - Reviewed the normal  and  with M2 sent out to [facility] labs. Reviewed the 10/12/2021 labs show Indeterminate Atypical ANCA, 1:80 ASMA, 1:320 Homogenous SAMMY, Negative anti-Soluble Liver Ag 1.3 and anti-LKM, - PH/o D/C Ursodiol 500 mg BID. Does not like to be on meds if not seem to help. And no evidence of PBC as suspected in the LBx, Elevated +ASMA 1:80 with elevated +ALP. Could re-try the Ursodiol Tx for 6 months with next visit if willing.   + Renal cyst - Simple cyst incidentally found in the US ab; no further intervention required. Fs/U Urology Dr. Seb Hussein. Simple cysts are sacs filled with fluid. The exact cause of cysts is unknown but cysts are more common with advancing age, they are usually asymptomatic and do not require any treatment. Very large renal cysts can cause dull pain or discomfort. Cysts can rarely become infected, develop bleeding, rupture or impaired function. Surgical removal or drainage and sclerotherapy of renal cysts can be performed in specific clinical settings.  + C/o Migraines- Was c/o dizziness, advised to c/w Sumatriptan for breakthrough episodes, advised to F/U with her neurology MD. - Remains on meds from psychiatrist. Discussed the LiverTox likelihood scoring with Amitriptyline- B.  # RHM:  + COL - Denies any FH/O Liver disease or GI cancers. Routine COL to be done as per PCP referral. + Immune to HAV (09/07/2021) and HBV (03/08/2023) s/p INJ with PCP. She completed the 2 dose COVID vaccine with no adverse effects in April 2021. S/p COVID infection in Feb 2021.  PLAN to F/U in 6 months for RPA with US ab, fasting labs (to re-discuss the GLP1 trial) and FS with RPA. Encouraged to call back in the interim with any issues or concerns so that we can address and assist as required.

## 2024-10-04 NOTE — PHYSICAL EXAM
[Scleral Icterus] : No Scleral Icterus [Spider Angioma] : No spider angioma(s) were observed [Abdominal  Ascites] : no ascites [Asterixis] : no asterixis observed [Jaundice] : No jaundice [Palmar Erythema] : no Palmar Erythema [Depression] : no depression [General Appearance - Alert] : alert [General Appearance - Well Nourished] : well nourished [General Appearance - Well-Appearing] : healthy appearing [Sclera] : the sclera and conjunctiva were normal [Neck Appearance] : the appearance of the neck was normal [Neck Cervical Mass (___cm)] : no neck mass was observed [Exaggerated Use Of Accessory Muscles For Inspiration] : no accessory muscle use [Apical Impulse] : the apical impulse was normal [Heart Sounds] : normal S1 and S2 [Edema] : there was no peripheral edema [Veins - Varicosity Changes] : there were no varicosital changes [Bowel Sounds] : normal bowel sounds [Abdomen Mass (___ Cm)] : no abdominal mass palpated [Abdomen Hernia] : no hernia was discovered [Cervical Lymph Nodes Enlarged Posterior Bilaterally] : posterior cervical [Supraclavicular Lymph Nodes Enlarged Bilaterally] : supraclavicular [Involuntary Movements] : no involuntary movements were seen [Nail Clubbing] : no clubbing  or cyanosis of the fingernails [] : no rash [Skin Lesions] : no skin lesions [Sensation] : the sensory exam was normal to light touch and pinprick [No Focal Deficits] : no focal deficits [Oriented To Time, Place, And Person] : oriented to person, place, and time

## 2024-10-04 NOTE — HISTORY OF PRESENT ILLNESS
[FreeTextEntry1] : Ms. FEMI LAM is 54-year-old female who presents with Liver enzymes elevations and LBx proven fatty liver. She feels well. Denies c/o abdominal pain, pruritis, melena, No MH/O Liver diseases, clinical hepatitis, Jaundice. Stable class 1 obese BMI 31.3 weighs 200 lbs.  Ph/o: D/C GLP1 with c/o N/V Found to be COIVD+ in the ED while hydrated with IVF. Started Ozempic SQ weekly INJ since 06/17/2023. Liver enzymes down trending with healthy dieting and w/o any weight loss. MH/O- Pre-diabetic and SH/O- Cholecystectomy for Cholelithiasis.  Denies any FH/O Liver disease or GI cancers.   Social H/O denies alcohol use or smoking. Reports no high-risk occupation/behavior.  Fibroscan on 10/03/2024 shows F0-F1 (E 4.8 kPa) and S0 ( dB/m)  Fibroscan on 09/12/2023 shows F0-1 (E 4.7 kPa) and S2 ( dB/m) LBx on 03/28/2023: CHRISTIANNE score Steatosis grade 1/Fibrosis stage 1A. Minimal steatohepatitis in background of mild macro vesicular steatosis. FS on 10/12/2021 shows F0-F1 - 4.5 kPa, S1 - Stage 1 - 11% to 33%  Normal Bone density done on 11/02/2021 repeat recommended in 5 years- 2026.  + Immune to HBV (03/08/2023), s/p 3-dose series with her PCP [facility] (09/07/2021). She completed the 2 dose COVID vaccine with no adverse effects in April 2021. S/p COVID infection in Feb 2021.   BW on 09/21/2024: F0 N2 S1 on TAPIA Fibrosure+ Isolated + ALP < 130, A1c 6.3% WDL- CMP, Lipids (first time), + Hepatic steatosis on 08/20/2024 US ab.  BW on 03/07/2024: ALP < 139 with normalized AST/ALT/Tgl; S2-S3, N1, F0 scored on TAPIA FibroSure+ ALT 46, CHO < 224, , Non-, Glucose 111, A1C 6.2% WDL- GGT.  BW on 08/30/2023: , AST/ALT 74/132, Tgl 214, Cho 232, HDL 50, , Non-, A1C 6.3% WDL-TB, INR, CBC. BW on 05/18/2023: AMA, / IgG Labs on 03/08/2023: Elevated AST/ALT 42/61, , A1C 6.1%, Tgl 164, Cho 256, , Non-, WDL- INR, CBC with noted Neut% Lymp%.  US abs on 09/07/22 shows Enlarged, fatty liver. Cholecystectomy. 2.5 cm left renal cyst.   Labs on 09/06/22 shows elevated ALT 50,  with WDL- AST, Tbil, BMP, ASMA 1:80, AMA < 1:20, CBC, GGT, Low INR 0.87.  Labs on 03/15/2022 shows elevated ALT 53,  with WDL-Tbil 0.6, AST 40, BMP, INR, and CBC.  Labs on 11/18/2021 shows elevated  with AST/ALT 33/43/0.3 Tbil, WDL- GGT, 1:80 ASMA, , Hb/Hct 13.7/42.8.  BW on 10/12/2021: Indeterminate Atypical ANCA, 1:80 ASMA, 1:320 Homogenous SAMMY, Negative anti-Soluble Liver Ag 1.3 and anti-LKM, High Vit A 70 and Insufficient Vit D 29.6. ALP Isoenzymes shows LBI-43/45/12%  Labs on 09/07/21 shows elevated ALT 57, , WDL- AST 38, Tbil 0.3, CBC, INR, Non-reactive acute Hepatitis A, B and C. A1C 6.3%, Hyperlipidemia with Cho/Tgl 210/278. Labs in 08/24/2021 shows AST/ALT 41/53,  with WDL Tbil 0.4. ASMA 1:80.   US ab on 06/11/2021 shows Hepatic steatosis, Hepatomegaly, Left renal cyst.

## 2024-12-25 PROBLEM — F10.90 ALCOHOL USE: Status: ACTIVE | Noted: 2021-09-07

## 2025-04-10 ENCOUNTER — APPOINTMENT (OUTPATIENT)
Dept: HEPATOLOGY | Facility: CLINIC | Age: 55
End: 2025-04-10

## 2025-05-29 ENCOUNTER — APPOINTMENT (OUTPATIENT)
Dept: HEPATOLOGY | Facility: CLINIC | Age: 55
End: 2025-05-29
Payer: COMMERCIAL

## 2025-05-29 VITALS
WEIGHT: 214 LBS | DIASTOLIC BLOOD PRESSURE: 78 MMHG | OXYGEN SATURATION: 99 % | TEMPERATURE: 97.6 F | SYSTOLIC BLOOD PRESSURE: 123 MMHG | BODY MASS INDEX: 33.59 KG/M2 | HEART RATE: 81 BPM | HEIGHT: 67 IN

## 2025-05-29 DIAGNOSIS — R74.8 ABNORMAL LEVELS OF OTHER SERUM ENZYMES: ICD-10-CM

## 2025-05-29 DIAGNOSIS — K76.0 FATTY (CHANGE OF) LIVER, NOT ELSEWHERE CLASSIFIED: ICD-10-CM

## 2025-05-29 PROCEDURE — 99214 OFFICE O/P EST MOD 30 MIN: CPT

## 2025-05-29 RX ORDER — CYANOCOBALAMIN (VITAMIN B-12) 500 MCG
400 LOZENGE ORAL
Qty: 120 | Refills: 0 | Status: ACTIVE | COMMUNITY
Start: 2025-05-29 | End: 1900-01-01

## 2025-06-07 ENCOUNTER — OUTPATIENT (OUTPATIENT)
Dept: OUTPATIENT SERVICES | Facility: HOSPITAL | Age: 55
LOS: 1 days | End: 2025-06-07
Payer: COMMERCIAL

## 2025-06-07 ENCOUNTER — APPOINTMENT (OUTPATIENT)
Dept: ULTRASOUND IMAGING | Facility: CLINIC | Age: 55
End: 2025-06-07
Payer: COMMERCIAL

## 2025-06-07 DIAGNOSIS — Z00.00 ENCOUNTER FOR GENERAL ADULT MEDICAL EXAMINATION WITHOUT ABNORMAL FINDINGS: ICD-10-CM

## 2025-06-07 PROCEDURE — 76700 US EXAM ABDOM COMPLETE: CPT

## 2025-06-07 PROCEDURE — 76700 US EXAM ABDOM COMPLETE: CPT | Mod: 26

## 2025-06-12 ENCOUNTER — APPOINTMENT (OUTPATIENT)
Dept: HEPATOLOGY | Facility: CLINIC | Age: 55
End: 2025-06-12

## 2025-07-07 ENCOUNTER — NON-APPOINTMENT (OUTPATIENT)
Age: 55
End: 2025-07-07

## 2025-08-12 ENCOUNTER — APPOINTMENT (OUTPATIENT)
Dept: HEPATOLOGY | Facility: CLINIC | Age: 55
End: 2025-08-12